# Patient Record
Sex: FEMALE | Race: ASIAN | Employment: FULL TIME | URBAN - METROPOLITAN AREA
[De-identification: names, ages, dates, MRNs, and addresses within clinical notes are randomized per-mention and may not be internally consistent; named-entity substitution may affect disease eponyms.]

---

## 2017-12-11 ENCOUNTER — HOSPITAL ENCOUNTER (EMERGENCY)
Facility: CLINIC | Age: 27
Discharge: HOME | End: 2017-12-11
Attending: EMERGENCY MEDICINE | Admitting: EMERGENCY MEDICINE
Payer: COMMERCIAL

## 2017-12-11 VITALS
SYSTOLIC BLOOD PRESSURE: 120 MMHG | TEMPERATURE: 98.2 F | HEART RATE: 92 BPM | OXYGEN SATURATION: 99 % | DIASTOLIC BLOOD PRESSURE: 82 MMHG | RESPIRATION RATE: 20 BRPM

## 2017-12-11 DIAGNOSIS — B34.9 VIRAL ILLNESS: ICD-10-CM

## 2017-12-11 LAB
DEPRECATED S PYO AG THROAT QL EIA: NORMAL
SPECIMEN SOURCE: NORMAL

## 2017-12-11 PROCEDURE — 87081 CULTURE SCREEN ONLY: CPT | Performed by: FAMILY MEDICINE

## 2017-12-11 PROCEDURE — 99284 EMERGENCY DEPT VISIT MOD MDM: CPT | Mod: Z6 | Performed by: EMERGENCY MEDICINE

## 2017-12-11 PROCEDURE — 99283 EMERGENCY DEPT VISIT LOW MDM: CPT | Performed by: EMERGENCY MEDICINE

## 2017-12-11 PROCEDURE — 25000132 ZZH RX MED GY IP 250 OP 250 PS 637: Performed by: EMERGENCY MEDICINE

## 2017-12-11 PROCEDURE — 87880 STREP A ASSAY W/OPTIC: CPT | Performed by: FAMILY MEDICINE

## 2017-12-11 RX ORDER — ACETAMINOPHEN 500 MG
500 TABLET ORAL EVERY 6 HOURS PRN
Qty: 60 TABLET | Refills: 0 | Status: SHIPPED | OUTPATIENT
Start: 2017-12-11 | End: 2017-12-18

## 2017-12-11 RX ORDER — ONDANSETRON 4 MG/1
4 TABLET, ORALLY DISINTEGRATING ORAL EVERY 8 HOURS PRN
Qty: 10 TABLET | Refills: 0 | Status: SHIPPED | OUTPATIENT
Start: 2017-12-11 | End: 2017-12-14

## 2017-12-11 RX ORDER — ACETAMINOPHEN 325 MG/1
650 TABLET ORAL ONCE
Status: COMPLETED | OUTPATIENT
Start: 2017-12-11 | End: 2017-12-11

## 2017-12-11 RX ORDER — IBUPROFEN 800 MG/1
800 TABLET, FILM COATED ORAL EVERY 8 HOURS PRN
Qty: 60 TABLET | Refills: 0 | Status: SHIPPED | OUTPATIENT
Start: 2017-12-11 | End: 2017-12-19

## 2017-12-11 RX ORDER — IBUPROFEN 800 MG/1
800 TABLET, FILM COATED ORAL ONCE
Status: COMPLETED | OUTPATIENT
Start: 2017-12-11 | End: 2017-12-11

## 2017-12-11 RX ADMIN — ACETAMINOPHEN 650 MG: 325 TABLET, FILM COATED ORAL at 22:18

## 2017-12-11 RX ADMIN — IBUPROFEN 800 MG: 800 TABLET ORAL at 22:18

## 2017-12-11 NOTE — ED AVS SNAPSHOT
[facility], [place], Emergency Department    [address]    [place] [address]    Phone:  [phone]    Fax:  [phone]                                       Diaz Raymond   MRN: [id number]    Department:  [facility], Emergency Department   Date of Visit:  12/11/2017           After Visit Summary Signature Page     I have received my discharge instructions, and my questions have been answered. I have discussed any challenges I see with this plan with the nurse or doctor.    ..........................................................................................................................................  Patient/Patient Representative Signature      ..........................................................................................................................................  Patient Representative Print Name and Relationship to Patient    ..................................................               ................................................  Date                                            Time    ..........................................................................................................................................  Reviewed by Signature/Title    ...................................................              ..............................................  Date                                                            Time

## 2017-12-11 NOTE — ED AVS SNAPSHOT
" [facility], Emergency Department    [address]    [facility] [address]    Phone:  [phone]    Fax:  [phone]                                       Diaz Raymond   MRN: [id number]    Department:  [facility] Emergency Department   Date of Visit:  12/11/2017           Patient Information     Date Of Birth          1990        Your diagnoses for this visit were:     Viral illness        You were seen by Shawn Belcher MD.      Follow-up Information     Follow up with [facility] Emergency Department.    Specialty:  EMERGENCY MEDICINE    Why:  If symptoms worsen    Contact information:    [address]  [facility] [address] [phone]    Additional information:    The [facility] is located in the [facility]. lt is easily accessible from virtually any point in the [facility] area, via [address]        Discharge Instructions       Please make an appointment to follow up with Your Primary Care Provider as soon as possible.      You must stay hydrated. I recommend mixing Gatorade and water and sip this over the course of the day. You should stay home from work for a few days. Make sure everyone at home is washing their hands.     Viral Syndrome (Adult)  A viral illness may cause a number of symptoms. The symptoms depend on the part of the body that the virus affects. If it settles in your nose, throat, and lungs, it may cause cough, sore throat, congestion, and sometimes headache. If it settles in your stomach and intestinal tract, it may cause vomiting and diarrhea. Sometimes it causes vague symptoms like \"aching all over,\" feeling tired, loss of appetite, or fever.  A viral illness usually lasts 1 to 2 weeks, but sometimes it lasts longer. In some cases, a more serious infection can look like a viral syndrome in the first few days of the illness. You may need another exam and additional tests to know the difference. Watch for the warning signs listed " below.  Home care  Follow these guidelines for taking care of yourself at home:    If symptoms are severe, rest at home for the first 2 to 3 days.    Stay away from cigarette smoke - both your smoke and the smoke from others.    You may use over-the-counter acetaminophen or ibuprofen for fever, muscle aching, and headache, unless another medicine was prescribed for this. If you have chronic liver or kidney disease or ever had a stomach ulcer or GI bleeding, talk with your doctor before using these medicines. No one who is younger than 18 and ill with a fever should take aspirin. It may cause severe disease or death.    Your appetite may be poor, so a light diet is fine. Avoid dehydration by drinking 8 to 12 8-ounce glasses of fluids each day. This may include water; orange juice; lemonade; apple, grape, and cranberry juice; clear fruit drinks; electrolyte replacement and sports drinks; and decaffeinated teas and coffee. If you have been diagnosed with a kidney disease, ask your doctor how much and what types of fluids you should drink to prevent dehydration. If you have kidney disease, drinking too much fluid can cause it build up in the your body and be dangerous to your health.    Over-the-counter remedies won't shorten the length of the illness but may be helpful for cough, sore throat; and nasal and sinus congestion. Don't use decongestants if you have high blood pressure.  Follow-up care  Follow up with your healthcare provider if you do not improve over the next week.  Call 911  Get emergency medical care if any of the following occur:    Convulsion    Feeling weak, dizzy, or like you are going to faint    Chest pain, shortness of breath, wheezing, or difficulty breathing  When to seek medical advice  Call your healthcare provider right away if any of these occur:    Cough with lots of colored sputum (mucus) or blood in your sputum    Chest pain, shortness of breath, wheezing, or difficulty breathing    Severe  headache; face, neck, or ear pain    Severe, constant pain in the lower right side of your belly (abdominal)    Continued vomiting (can t keep liquids down)    Frequent diarrhea (more than 5 times a day); blood (red or black color) or mucus in diarrhea    Feeling weak, dizzy, or like you are going to faint    Extreme thirst    Fever of 100.4 F (38 C) or higher, or as directed by your healthcare provider  Date Last Reviewed: 9/25/2015 2000-2017 [employer]. [address] [place] [address]. All rights reserved. This information is not intended as a substitute for professional medical care. Always follow your healthcare professional's instructions.          24 Hour Appointment Hotline       To make an appointment at any [facility], call [phone] ([phone]). If you don't have a family doctor or clinic, we will help you find one. [facility] clinics are conveniently located to serve the needs of you and your family.             Review of your medicines      START taking        Dose / Directions Last dose taken    acetaminophen 500 MG tablet   Commonly known as:  TYLENOL   Dose:  500 mg   Quantity:  60 tablet        Take 1 tablet (500 mg) by mouth every 6 hours as needed for pain or fever   Refills:  0        guaiFENesin 100 MG/5ML Syrp   Commonly known as:  ROBITUSSIN   Dose:  10 mL   Quantity:  1 Bottle        Take 10 mLs by mouth every 4 hours as needed for cough   Refills:  0        ibuprofen 800 MG tablet   Commonly known as:  ADVIL/MOTRIN   Dose:  800 mg   Quantity:  60 tablet        Take 1 tablet (800 mg) by mouth every 8 hours as needed for moderate pain   Refills:  0        ondansetron 4 MG ODT tab   Commonly known as:  ZOFRAN ODT   Dose:  4 mg   Quantity:  10 tablet        Take 1 tablet (4 mg) by mouth every 8 hours as needed for nausea   Refills:  0                Prescriptions were sent or printed at these locations (4 Prescriptions)                   Other  "Prescriptions                Printed at Department/Unit printer (4 of 4)         ibuprofen (ADVIL/MOTRIN) 800 MG tablet               acetaminophen (TYLENOL) 500 MG tablet               guaiFENesin (ROBITUSSIN) 100 MG/5ML SYRP               ondansetron (ZOFRAN ODT) 4 MG ODT tab                Procedures and tests performed during your visit     Beta strep group A culture    Rapid strep screen      Orders Needing Specimen Collection     None      Pending Results     Date and Time Order Name Status Description    12/11/2017 2027 Beta strep group A culture In process             Pending Culture Results     Date and Time Order Name Status Description    12/11/2017 2027 Beta strep group A culture In process             Pending Results Instructions     If you had any lab results that were not finalized at the time of your Discharge, you can call the ED Lab Result RN at [phone]. You will be contacted by this team for any positive Lab results or changes in treatment. The nurses are available 7 days a week from 10A to 6:30P.  You can leave a message 24 hours per day and they will return your call.        Thank you for choosing [facility]       Thank you for choosing [facility] for your care. Our goal is always to provide you with excellent care. Hearing back from our patients is one way we can continue to improve our services. Please take a few minutes to complete the written survey that you may receive in the mail after you visit with us. Thank you!        [employer] Information     [employer] lets you send messages to your doctor, view your test results, renew your prescriptions, schedule appointments and more. To sign up, go to [email or web address] . Click on \"Log in\" on the left side of the screen, which will take you to the Welcome page. Then click on \"Sign up Now\" on the right side of the page.     You will be asked to enter the access code listed below, as well as some personal information. Please follow the directions " to create your username and password.     Your access code is: [email or web address]  Expires: 3/11/2018 10:24 PM     Your access code will  in 90 days. If you need help or a new code, please call your [facility] clinic or [phone].        Care EveryWhere ID     This is your Care EveryWhere ID. This could be used by other organizations to access your [facility] medical records  [id number]        Equal Access to Services     [facility] Center[facility]ERAMY : Hadii enrique martinez hadasho Somajorali, waaxda luqadaha, qaybta kaalmada adeegyada, rafat roger . So [facility] [phone].    ATENCIÓN: Si habla español, tiene a lewis disposición servicios gratuitos de asistencia lingüística. Llame al [phone].    We comply with applicable federal civil rights laws and [place] laws. We do not discriminate on the basis of race, color, national origin, age, disability, sex, sexual orientation, or gender identity.            After Visit Summary       This is your record. Keep this with you and show to your community pharmacist(s) and doctor(s) at your next visit.

## 2017-12-12 NOTE — ED PROVIDER NOTES
[facility] ([facility])    12/11/17     ED 6 10:05 PM   History     Chief Complaint   Patient presents with     Fever     onset last night with fever, cough, sore throat, HA. T101.2 (tymp) upon arrival to ED.     The history is provided by the patient and a relative.     Diaz Raymond is a 27 year old female who presents with fever, cough, sore throat, headache that started last night. Patient states she felt fine throughout the weekend, felt fine in the morning with exception of a mild cough. She started to feel a little feverish yesterday evening. She states her fever continued to rise and by today she had a fever of 100.9  F at home. Family clarify that this was an axillary temperature. She endorses cough, sore throat, headache, back aches and myalgias. She vomited once, about 3 hours ago.  No abdominal pain. No diarrhea. No pain with urination. She denies sick contacts. No recent travel. No other medical problems. No daily medications. No allergies. She is otherwise healthy at baseline. She did try tylenol at 6pm today.     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     I have reviewed the Medications, Allergies, Past Medical and Surgical History, and Social History in the [employer] system.  History reviewed. No pertinent past medical history.    History reviewed. No pertinent surgical history.    No family history on file.    Social History   Substance Use Topics     Smoking status: Never Smoker     Smokeless tobacco: Never Used     Alcohol use No      Review of Systems  All other systems negative except as noted in the HPI.   Physical Exam   BP: 116/80  Pulse: 104  Temp: 100.2  F (37.9  C) (took tylenol ~2 hours ago)  Resp: 16  SpO2: 94 %      Physical Exam  Gen: NAD, sitting on stretcher, conversant and pleasant, non-toxic appearing  HEENT: NCAT, PERRL, EOMI, MMM  Neck: trachea midline, supple with FROM  Cardio: normal rate regular rhythm, no R/M/G, normally perfused and  warm  Pulm: steady, non-labored respirations, normal WOB, CTAB, no w/r/r  Abd: soft nt/nd, no organomegaly, no CVA tenderness  Ext: normal peripheral pulses, no edema, neurovascularly intact distally.  Skin: no rashes or signs of trauma  Neuro: no focal deficits, 5/5 strength in all ext   ED Course     ED Course     Procedures  Patient assessed in ED 6 at 10:05 PM by Dr. Belcher.     Labs Ordered and Resulted from Time of ED Arrival Up to the Time of Departure from the ED   RAPID STREP SCREEN   BETA STREP GROUP A CULTURE            Assessments & Plan (with Medical Decision Making)     This is an otherwise healthy 19-year-old female presents to the emergency department with a day and a half of body aches sore throat headache and fatigue likely secondary to a viral syndrome.  On exam she is well-appearing with normal vital signs.  She has full range of motion of her neck a benign abdomen and a clear chest.  I doubt any serious bacterial infections including meningitis pneumonia strep or urinary tract infection. Certainly could be influenza but given that she is healthy and otherwise well-appearing will treat symptomatically.  Encouraged her to take ibuprofen and Tylenol around-the-clock gave her cough syrup as well as some Zofran to help with her nausea and encouraged her to increase the amount of fluid she is taking she was discharged home at this time in good condition encouraged to rest for the next 2 days and follow-up with primary care.  She was given return emergency department precautions and discharged at this time    I have reviewed the nursing notes.    I have reviewed the findings, diagnosis, plan and need for follow up with the patient.    Discharge Medication List as of 12/11/2017 10:25 PM      START taking these medications    Details   ibuprofen (ADVIL/MOTRIN) 800 MG tablet Take 1 tablet (800 mg) by mouth every 8 hours as needed for moderate pain, Disp-60 tablet, R-0, Local Print      acetaminophen  (TYLENOL) 500 MG tablet Take 1 tablet (500 mg) by mouth every 6 hours as needed for pain or fever, Disp-60 tablet, R-0, Local Print      guaiFENesin (ROBITUSSIN) 100 MG/5ML SYRP Take 10 mLs by mouth every 4 hours as needed for cough, Disp-1 Bottle, R-0, Local Print      ondansetron (ZOFRAN ODT) 4 MG ODT tab Take 1 tablet (4 mg) by mouth every 8 hours as needed for nausea, Disp-10 tablet, R-0, Local Print             Final diagnoses:   Viral illness     I, Shayna Braswell, am serving as a trained medical scribe to document services personally performed by Shawn Belcher MD, based on the provider's statements to me on December 11, 2017.  This document has been checked and approved by the attending provider.    I, Shawn Belcher MD, was physically present and have reviewed and verified the accuracy of this note documented by Shayna Braswell, medical scribe.     12/11/2017   [facility], [place], EMERGENCY DEPARTMENT     Shawn Belcher MD  12/12/17 0033

## 2017-12-12 NOTE — DISCHARGE INSTRUCTIONS
"Please make an appointment to follow up with Your Primary Care Provider as soon as possible.      You must stay hydrated. I recommend mixing Gatorade and water and sip this over the course of the day. You should stay home from work for a few days. Make sure everyone at home is washing their hands.     Viral Syndrome (Adult)  A viral illness may cause a number of symptoms. The symptoms depend on the part of the body that the virus affects. If it settles in your nose, throat, and lungs, it may cause cough, sore throat, congestion, and sometimes headache. If it settles in your stomach and intestinal tract, it may cause vomiting and diarrhea. Sometimes it causes vague symptoms like \"aching all over,\" feeling tired, loss of appetite, or fever.  A viral illness usually lasts 1 to 2 weeks, but sometimes it lasts longer. In some cases, a more serious infection can look like a viral syndrome in the first few days of the illness. You may need another exam and additional tests to know the difference. Watch for the warning signs listed below.  Home care  Follow these guidelines for taking care of yourself at home:    If symptoms are severe, rest at home for the first 2 to 3 days.    Stay away from cigarette smoke - both your smoke and the smoke from others.    You may use over-the-counter acetaminophen or ibuprofen for fever, muscle aching, and headache, unless another medicine was prescribed for this. If you have chronic liver or kidney disease or ever had a stomach ulcer or GI bleeding, talk with your doctor before using these medicines. No one who is younger than 18 and ill with a fever should take aspirin. It may cause severe disease or death.    Your appetite may be poor, so a light diet is fine. Avoid dehydration by drinking 8 to 12 8-ounce glasses of fluids each day. This may include water; orange juice; lemonade; apple, grape, and cranberry juice; clear fruit drinks; electrolyte replacement and sports drinks; and " decaffeinated teas and coffee. If you have been diagnosed with a kidney disease, ask your doctor how much and what types of fluids you should drink to prevent dehydration. If you have kidney disease, drinking too much fluid can cause it build up in the your body and be dangerous to your health.    Over-the-counter remedies won't shorten the length of the illness but may be helpful for cough, sore throat; and nasal and sinus congestion. Don't use decongestants if you have high blood pressure.  Follow-up care  Follow up with your healthcare provider if you do not improve over the next week.  Call 911  Get emergency medical care if any of the following occur:    Convulsion    Feeling weak, dizzy, or like you are going to faint    Chest pain, shortness of breath, wheezing, or difficulty breathing  When to seek medical advice  Call your healthcare provider right away if any of these occur:    Cough with lots of colored sputum (mucus) or blood in your sputum    Chest pain, shortness of breath, wheezing, or difficulty breathing    Severe headache; face, neck, or ear pain    Severe, constant pain in the lower right side of your belly (abdominal)    Continued vomiting (can t keep liquids down)    Frequent diarrhea (more than 5 times a day); blood (red or black color) or mucus in diarrhea    Feeling weak, dizzy, or like you are going to faint    Extreme thirst    Fever of 100.4 F (38 C) or higher, or as directed by your healthcare provider  Date Last Reviewed: 9/25/2015 2000-2017 [employer]. [address] [place] [address]. All rights reserved. This information is not intended as a substitute for professional medical care. Always follow your healthcare professional's instructions.

## 2017-12-13 LAB
BACTERIA SPEC CULT: NORMAL
SPECIMEN SOURCE: NORMAL

## 2020-10-26 ENCOUNTER — TELEPHONE (OUTPATIENT)
Dept: FAMILY MEDICINE | Facility: CLINIC | Age: 30
End: 2020-10-26

## 2020-10-26 ENCOUNTER — OFFICE VISIT (OUTPATIENT)
Dept: FAMILY MEDICINE | Facility: CLINIC | Age: 30
End: 2020-10-26
Payer: COMMERCIAL

## 2020-10-26 VITALS
TEMPERATURE: 98.5 F | HEART RATE: 71 BPM | WEIGHT: 178.2 LBS | DIASTOLIC BLOOD PRESSURE: 80 MMHG | SYSTOLIC BLOOD PRESSURE: 115 MMHG | RESPIRATION RATE: 18 BRPM | OXYGEN SATURATION: 99 %

## 2020-10-26 DIAGNOSIS — Z23 NEED FOR PROPHYLACTIC VACCINATION AND INOCULATION AGAINST INFLUENZA: ICD-10-CM

## 2020-10-26 DIAGNOSIS — N91.2 AMENORRHEA: ICD-10-CM

## 2020-10-26 DIAGNOSIS — N93.9 VAGINAL SPOTTING: ICD-10-CM

## 2020-10-26 DIAGNOSIS — Z32.01 PREGNANCY TEST POSITIVE: Primary | ICD-10-CM

## 2020-10-26 LAB — HCG UR QL: POSITIVE

## 2020-10-26 PROCEDURE — 90686 IIV4 VACC NO PRSV 0.5 ML IM: CPT | Performed by: STUDENT IN AN ORGANIZED HEALTH CARE EDUCATION/TRAINING PROGRAM

## 2020-10-26 PROCEDURE — 90471 IMMUNIZATION ADMIN: CPT | Performed by: STUDENT IN AN ORGANIZED HEALTH CARE EDUCATION/TRAINING PROGRAM

## 2020-10-26 PROCEDURE — 81025 URINE PREGNANCY TEST: CPT | Performed by: STUDENT IN AN ORGANIZED HEALTH CARE EDUCATION/TRAINING PROGRAM

## 2020-10-26 PROCEDURE — 99203 OFFICE O/P NEW LOW 30 MIN: CPT | Mod: 25 | Performed by: STUDENT IN AN ORGANIZED HEALTH CARE EDUCATION/TRAINING PROGRAM

## 2020-10-26 RX ORDER — PRENATAL VIT/IRON FUM/FOLIC AC 27MG-0.8MG
1 TABLET ORAL DAILY
Qty: 90 TABLET | Refills: 3 | Status: SHIPPED | OUTPATIENT
Start: 2020-10-26 | End: 2021-04-09

## 2020-10-26 NOTE — PROGRESS NOTES
Nursing Notes:   Jeovany Juarez EMT  10/26/2020  4:39 PM  Signed  Due to patient being non-English speaking/uses sign language, an  was used for this visit. Only for face-to-face interpretation by an external agency, date and length of interpretation can be found on the scanned worksheet.       name:  [facility]  Agency: Grecia Oliveira  Language: Bessy  Telephone number:   [phone]  Type of interpretation:  telephone, Spoken    EBENEZER Doran  4:39 PM  10/26/2020      Chief Complaint   Patient presents with     Pregnancy Test     UPT, bleeding discharge, onset 2 days ago.     Imm/Inj     Flu Shot     Blood pressure 115/80, pulse 71, temperature 98.5  F (36.9  C), resp. rate 18, weight 80.8 kg (178 lb 3.2 oz), SpO2 99 %.           SUBJECTIVE       Cho is a 30 year old  female with a PMH significant for:   There is no problem list on file for this patient.    Pregnancy Test/Confirmation      Cho is a30 year old Woman, No obstetric history on file.  without a significant past medical history who presents requesting a pregnancy test.   LMP 8/29/2020. States that she took an OTC pregnancy test a month ago that was positive. Has been taking prenatal vitamins since, but has not established OB care with anyone yet.  Contraceptive method : none  Symptoms of pregnancy: fatigue  Any Bleeding since LMP? Yes Details: light vaginal spotting, first noticed yesterday. Also with some lower abdominal cramping.     If pregnant, pregnancy is Unplanned, Desired    Patient reports feeling safe and home and work. Has a female friend who is here today with her.       A Bsesy  was used for this visit.    PMH, Medications and Allergies were reviewed and updated as needed.        REVIEW OF SYSTEMS     Constitutional, HEENT, cardiovascular, pulmonary, gi and gu systems are negative, except as otherwise noted.          OBJECTIVE     Vitals:    10/26/20 1620   BP: 115/80   BP Location: Left arm   Patient Position: Left  side   Cuff Size: Adult Regular   Pulse: 71   Resp: 18   Temp: 98.5  F (36.9  C)   SpO2: 99%   Weight: 80.8 kg (178 lb 3.2 oz)     There is no height or weight on file to calculate BMI.    Constitutional: Awake, alert, cooperative, no acute distress, and appears stated age.  Eyes: sclera clear, conjunctiva normal.  ENT: NC/AT, MMM  Neck: Supple, symmetrical, trachea midline  Back: Symmetric, no curvature  Lungs: No increased WOB, CTABL, no crackles or wheezing appreciated.  Cardiovascular: RRR, normal S1 and S2, no S3 or S4, and no murmur appreciated.  Abdomen: Normal BS, soft, non-distended, non-tender  Musculoskeletal: No redness, warmth, or swelling of the joints. Tone is normal.  Neurologic: A&Ox3.  Cranial nerves II-XII are grossly intact.  Sensory is intact and gait is normal.  Neuropsychiatric: Normal affect, mood, orientation, memory and insight.  Skin: No visible rashes, erythema, pallor, petechia or purpura.      Results for orders placed or performed in visit on 10/26/20   HCG Qualitative Urine (UPT)  ([facility])     Status: Abnormal   Result Value Ref Range    HCG Qual Urine POSITIVE (A) Negative       ASSESSMENT AND PLAN     Cho was seen today for pregnancy test and imm/inj.    Diagnoses and all orders for this visit:    Pregnancy test positive  Amenorrhea  Vaginal spotting  Advised patient to go to [facility] ED for BPP and NST to ensure viability and confirmation. Will forward patient's chart to our OB coordinator to set up initial OB visit with female provider, per patient preference.   -     Prenatal Vit-Fe Fumarate-FA (PRENATAL MULTIVITAMIN W/IRON) 27-0.8 MG tablet; Take 1 tablet by mouth daily  -     HCG Qualitative Urine (UPT)  ([facility])    Need for prophylactic vaccination and inoculation against influenza  -     INFLUENZA VACCINE IM > 6 MONTHS VALENT IIV4 [16989]      Of note, US OB on 10/26/20 without evidence of intrauterine pregnancy, but with thickened endometrium which may be physiologic.  Patient likely will need quantitative beta hCG levels.      Return in about 2 weeks (around 11/9/2020) for initial OB visit.    Hiro Ramírez MD  10/26/2020

## 2020-10-26 NOTE — PROGRESS NOTES
Preceptor Attestation:    Patient seen and evaluated in person. I discussed the patient with the resident. I have verified the content of the note, which accurately reflects my assessment of the patient and the plan of care.   Supervising Physician:  Dwayne Muniz MD.

## 2020-10-26 NOTE — PATIENT INSTRUCTIONS
Patient Education     Bleeding During Early Pregnancy     Ultrasound can help check the health of your fetus.     If you ve had bleeding early in your pregnancy, you re not alone. Many other pregnant women have had early bleeding, too. And in most cases, nothing is wrong. But your healthcare provider still needs to know about it. He or she may want to do tests to find out why you re bleeding. Call your healthcare provider if you notice bleeding during pregnancy.  What causes early bleeding?  The cause of bleeding early in pregnancy is often unknown. But many factors early on in pregnancy may lead to bleeding or spotting. These include sexual intercourse, which may cause bleeding in any trimester. Here are some other causes:    Implantation of the embryo on the uterine wall    Subchorionic hemorrhage (bleeding between the sac membrane and the uterus)    Miscarriage    Ectopic (tubal) pregnancy  If you notice spotting  Spotting (very light bleeding) is the most common type of bleeding in early pregnancy. If you notice it, call your healthcare provider. Chances are, he or she will tell you that you can care for yourself at home.  If tests are needed  Depending on how much you bleed, your healthcare provider may ask you to come in for some tests. A pelvic exam, for instance, can help see how far along your pregnancy is. You also may have an ultrasound or a Doppler test. These imaging tests use sound waves to check the health of your fetus. The ultrasound may be done on your belly or inside your vagina. Your healthcare provider also may order a special blood test. This test compares your hormone levels in blood samples taken 2 days apart. The results can help your healthcare provider learn more about the implantation of the embryo. Your blood type will also need to be checked to evaluate whether you will need to be treated for Rh sensitization.   Warning signs  If your bleeding doesn t stop or if you notice any of the  following, seek medical help right away:    Soaking a sanitary pad each hour    Bleeding like you re having a period    Cramping or severe belly pain    Feeling dizzy or faint    Tissue passing through your vagina    Bleeding at any time after the first trimester  Questions you may be asked  Though not normal, bleeding early in pregnancy is common. If you ve noticed any bleeding, you may be concerned. But keep in mind that bleeding alone doesn t mean something is wrong. Call your healthcare provider right away, though. He or she may ask you questions like these to help find the cause of your bleeding:    When did your bleeding start?    Is your bleeding very light (spotting) or is it like a period?    Is the blood bright red or brownish?    Have you had sexual intercourse recently?    Have you had pain or cramping?    Have you felt dizzy or faint?  Monitoring your pregnancy  Bleeding will often stop as quickly as it began. Your pregnancy may go on a normal path again. You may need to make a few extra prenatal visits. But you and your baby will most likely be fine.  Date Last Reviewed: 6/1/2016 2000-2019 The [employer]. [address] [place] [address]. All rights reserved. This information is not intended as a substitute for professional medical care. Always follow your healthcare professional's instructions.           Patient Education     Established Pregnancy, Normal Symptoms    You are pregnant and are having symptoms that worry you. During pregnancy, it s normal to have many kinds of symptoms. Here is a list of common symptoms that happen during pregnancy.  Head and mouth    Bleeding gums    Dizziness and fainting    Extra saliva    Headaches    Nosebleeds    Skin color changes on your face    Stuffy nose    Mild blurriness of vision, especially if you wear contact lenses  Breasts    Darkening of nipples    Yellow or white discharge from the nipples    Sore breasts and nipples    Swollen  breasts  Back, arms, and legs    Back, hip, or thigh pain    Leg cramps that come and go    Numbness and tingling in your hands and fingers    Swollen hands, legs, and feet    Swollen leg veins  Belly (abdomen) and pelvis    Constipation    Feeling of pressure on your bladder and stomach    Need to urinate often    Gas and bloating    Heartburn    Anal itching, swelling, and bleeding (hemorrhoids)    Leaking urine    Mild pressure or cramping in your belly    Nausea and vomiting throughout the day or night (morning sickness)    Swollen belly    Clear to white vaginal discharge  Other symptoms    Dry, itchy skin    Forgetfulness    Less interest in sex    Mood swings    Tiredness    Trouble sleeping  Home care  Here is information that may help relieve some common pregnancy symptoms.  Sore and swollen breasts    Wear a support bra that fits properly.  Nausea and indigestion    Eat smaller meals or snacks more often.    Eat bland foods, such as bananas, crackers, or rice.    Stay away from spicy, fatty, or fried foods.    Stay away from alcohol, caffeine, and tobacco.    Don t lie down right after eating.    Raise your head with pillows when you lie down.    Eat foods or beverages that have ginger. If you drink ginger ale, be sure to make sure it has real ginger and not just ginger flavoring.  Leg swelling and varicose veins    Wear elastic support hose. Put your feet up as often as possible.  Constipation    Eat more fresh fruits and vegetables and more whole grains. Drink more clear liquids.  Joint and muscle pains    Avoid heavy lifting.    Pick things up by bending at your knees, not at your waist.    Use acetaminophen for joint and muscle pain. Don't use aspirin, ibuprofen, or naproxen.  Mouth and nose dryness or bleeding    Drink more liquids. Use a vaporizer or humidifier in your bedroom.  Don t take medicines or use remedies that your healthcare provider hasn t approved. If you have symptoms that are severe or  sudden, call your healthcare provider.  Call 911  Call 911 if any of these occur:    New chest, arm, shoulder, neck, or upper back pain    Trouble breathing    Severe belly pain or very heavy bleeding    Severe lightheadedness, passing out, or fainting    Rapid heart rate    Confusion or difficulty waking up  When to seek medical advice  Call your healthcare provider right away if any of these occur:    Burning or pain when you urinate    Depression or severe anxiety    Desire to eat or drink nonfood items such as paper, dirt, or cleaning products    Diarrhea that lasts more than 24 hours    Fast heartbeat or heart palpitations    Fever of 100.4 F (38 C) or higher, or as directed by your healthcare provider    You can t keep fluids down for 6 hours without vomiting    Severe or ongoing vomiting    Little or no urine    Major vision changes    Moderate or severe belly pain    Severe back pain    Severe constipation    Severe cramping or swelling in a leg, especially if it s just on one side    Severe headache    Sudden swelling of your face, hands, feet, or ankles    Vaginal bleeding    Very itchy skin that doesn t get better  Date Last Reviewed: 10/1/2016    9364-8906 The [employer]. [address] [place] [address]. All rights reserved. This information is not intended as a substitute for professional medical care. Always follow your healthcare professional's instructions.

## 2020-10-26 NOTE — NURSING NOTE
Due to patient being non-English speaking/uses sign language, an  was used for this visit. Only for face-to-face interpretation by an external agency, date and length of interpretation can be found on the scanned worksheet.       name:  [facility]  Agency: [employer] Olive[employer]  Language: Bessy  Telephone number:   [phone]  Type of interpretation:  telephone, Spoken    EBENEZER Doran  4:39 PM  10/26/2020

## 2020-10-26 NOTE — TELEPHONE ENCOUNTER
Answering Service    I received a page from the answering service 5:38 PM re: Ultrasound. I returned the call at 5:56pm. Mrs. Raymond was calling to clarify where she should go for ultrasound.     Mrs. Raymond was seen in clinic by Dr. Ramírez today. Had a positive pregnancy test, but was also experiencing vaginal bleeding. The patient was instructed to go to [facility] for an ultrasound. Dr. Ramírez called the maternity floor and clarified that the patient should go through the ED instead of going directly to the maternity floor.    I called the patient back and clarified that she should go through the ED to get care at [facility]. All questions were answered.     Cynthia Hunt MD PGY2  [facility]  10/26/2020

## 2020-10-28 ENCOUNTER — TELEPHONE (OUTPATIENT)
Dept: FAMILY MEDICINE | Facility: CLINIC | Age: 30
End: 2020-10-28

## 2020-10-28 DIAGNOSIS — Z32.01 PREGNANCY TEST POSITIVE: Primary | ICD-10-CM

## 2020-10-28 NOTE — TELEPHONE ENCOUNTER
[facility] phone call message- general phone call:    Reason for call: Pt coming in for labs on 10/29/2020, possible miscarriage, needs order for hormone levels    Action desired: put in lab order     Return call needed: No    OK to leave a message on voice mail? No    Advised patient to response may take up to 2 business days: No    Primary language: English      needed? No    Call taken on October 28, 2020 at 11:22 AM by Africa Lynch

## 2020-10-29 DIAGNOSIS — Z32.01 PREGNANCY TEST POSITIVE: ICD-10-CM

## 2020-10-29 LAB — B-HCG SERPL-ACNC: 432 MLU/ML (ref 0–4)

## 2020-11-02 ENCOUNTER — OFFICE VISIT (OUTPATIENT)
Dept: FAMILY MEDICINE | Facility: CLINIC | Age: 30
End: 2020-11-02
Payer: COMMERCIAL

## 2020-11-02 VITALS
DIASTOLIC BLOOD PRESSURE: 79 MMHG | WEIGHT: 176.4 LBS | OXYGEN SATURATION: 94 % | RESPIRATION RATE: 24 BRPM | TEMPERATURE: 98.8 F | SYSTOLIC BLOOD PRESSURE: 112 MMHG | HEART RATE: 80 BPM

## 2020-11-02 DIAGNOSIS — O03.9 SPONTANEOUS ABORTION: Primary | ICD-10-CM

## 2020-11-02 PROCEDURE — 90471 IMMUNIZATION ADMIN: CPT | Performed by: STUDENT IN AN ORGANIZED HEALTH CARE EDUCATION/TRAINING PROGRAM

## 2020-11-02 PROCEDURE — 90715 TDAP VACCINE 7 YRS/> IM: CPT | Performed by: STUDENT IN AN ORGANIZED HEALTH CARE EDUCATION/TRAINING PROGRAM

## 2020-11-02 PROCEDURE — 99213 OFFICE O/P EST LOW 20 MIN: CPT | Mod: 25 | Performed by: STUDENT IN AN ORGANIZED HEALTH CARE EDUCATION/TRAINING PROGRAM

## 2020-11-02 NOTE — PROGRESS NOTES
SUBJECTIVE       Cho Segundo is a 30 year old  female with a PMH significant for:   There is no problem list on file for this patient.    She presents for follow up of <14 week OB ultrasound following vaginal bleeding and low quantitative HCG. Pt's ultrasouond results were discussed with her which confirmed a miscarriage. Pt understood the results.    Pt wants to get pregnant again. She does not want to go on any form of birth control at this time. Pt had questions regarding why she may have had a  Miscarriage and what the likelihood she will be able to get pregnant is.     PMH, Medications and Allergies were reviewed and updated as needed.        REVIEW OF SYSTEMS     7 point ROS negative except per HPI        OBJECTIVE     Vitals:    20 1624   BP: 112/79   BP Location: Left arm   Patient Position: Sitting   Cuff Size: Adult Regular   Pulse: 80   Resp: 24   Temp: 98.8  F (37.1  C)   TempSrc: Oral   SpO2: 94%   Weight: 80 kg (176 lb 6.4 oz)     There is no height or weight on file to calculate BMI.    Constitutional: Awake, alert, cooperative, no apparent distress, and appears stated age.  Eyes: Lids and lashes normal, pupils equal, round and reactive to light, extra ocular muscles intact, sclera clear, conjunctiva normal.  Lungs: No increased work of breathing, good air exchange, clear to auscultation bilaterally, no crackles or wheezing.  Cardiovascular: Regular rate and rhythm, normal S1 and S2, no S3 or S4, and no murmur noted.  Neurologic: Awake, alert, oriented to name, place and time.  Cranial nerves II-XII are grossly intact.    Neuropsychiatric: Normal affect, mood, orientation, memory and insight.  Skin: No rashes, erythema, pallor, petechia or purpura.    No results found for any visits on 20.        ASSESSMENT AND PLAN     Diaz was seen today for follow up.    Diagnoses and all orders for this visit:    Spontaneous   Pts ultrasound and quantitative HCG suggested miscarriage. HCG on  10/26/20 was 4262 and then on 10/29/20 was 432. She was counseled on birth control options but stated she wants to get pregnant again. Pt advised to continue prenatal vitamin and to avoid NSAIDs, alcohol, and tobacco.     Other orders  -     TDAP VACCINE (Adacel, Boostrix)  [[id number]]          RTC in as needed   Sukhdeep Inman MD  Precepted with Dr Sabiha Morales

## 2021-02-22 ENCOUNTER — OFFICE VISIT (OUTPATIENT)
Dept: FAMILY MEDICINE | Facility: CLINIC | Age: 31
End: 2021-02-22
Payer: COMMERCIAL

## 2021-02-22 VITALS
OXYGEN SATURATION: 97 % | HEART RATE: 79 BPM | DIASTOLIC BLOOD PRESSURE: 75 MMHG | RESPIRATION RATE: 20 BRPM | TEMPERATURE: 98.5 F | WEIGHT: 183.4 LBS | SYSTOLIC BLOOD PRESSURE: 110 MMHG

## 2021-02-22 DIAGNOSIS — N91.2 AMENORRHEA: Primary | ICD-10-CM

## 2021-02-22 DIAGNOSIS — O20.9 FIRST TRIMESTER BLEEDING: ICD-10-CM

## 2021-02-22 DIAGNOSIS — R11.0 NAUSEA: ICD-10-CM

## 2021-02-22 LAB
B-HCG SERPL-ACNC: ABNORMAL MLU/ML (ref 0–4)
HCG UR QL: POSITIVE

## 2021-02-22 PROCEDURE — 81025 URINE PREGNANCY TEST: CPT | Performed by: STUDENT IN AN ORGANIZED HEALTH CARE EDUCATION/TRAINING PROGRAM

## 2021-02-22 PROCEDURE — 99213 OFFICE O/P EST LOW 20 MIN: CPT | Mod: GC | Performed by: STUDENT IN AN ORGANIZED HEALTH CARE EDUCATION/TRAINING PROGRAM

## 2021-02-22 PROCEDURE — 36415 COLL VENOUS BLD VENIPUNCTURE: CPT | Performed by: STUDENT IN AN ORGANIZED HEALTH CARE EDUCATION/TRAINING PROGRAM

## 2021-02-22 RX ORDER — PYRIDOXINE HCL (VITAMIN B6) 25 MG
25 TABLET ORAL 3 TIMES DAILY PRN
Qty: 60 TABLET | Refills: 11 | Status: SHIPPED | OUTPATIENT
Start: 2021-02-22 | End: 2021-05-04

## 2021-02-22 NOTE — PROGRESS NOTES
SUBJECTIVE       Cho C Segundo is a 31 year old  female with a PMH significant for:   There is no problem list on file for this patient.    Patient presents with:  Pregnancy Test: upt, missed period for 1 month now and test at home was positive  Nausea: feel nausea    Patient reports that she is having some spotting. She had a positive at home pregnancy test late . Yesterday an today she had some bleeding, a small amount when wiping. No abdominal pain or fever. She has had one miscarriage in the past. She also reports some nausea and breast tenderness. She is interested in managing her nausea medically.     PMH, Medications and Allergies were reviewed and updated as needed.          OBJECTIVE     Vitals:    21 1308   BP: 110/75   BP Location: Right arm   Patient Position: Sitting   Cuff Size: Adult Regular   Pulse: 79   Resp: 20   Temp: 98.5  F (36.9  C)   TempSrc: Tympanic   SpO2: 97%   Weight: 83.2 kg (183 lb 6.4 oz)     There is no height or weight on file to calculate BMI.    General :  healthy and alert, no distress  HEENT:  PERRLA  Cardiovascular: regular rate and rhythm, normal S1/S2 no other heart sounds  Respiratory:  CTA, normal respiratory effort  Musculoskeletal: no edema  Gastrointestinal:       abdomen soft, non-tender, non-distended, no organomegaly or masses, normal bowel sounds    Results for orders placed or performed in visit on 21 (from the past 24 hour(s))   HCG Qualitative Urine (UPT)  ([facility])   Result Value Ref Range    HCG Qual Urine Positive (A) Negative       ASSESSMENT AND PLAN       Diaz was seen today for pregnancy test and nausea.    Diagnoses and all orders for this visit:    Amenorrhea  -     HCG Qualitative Urine (UPT)  ([facility])  -     doxylamine (UNISOM) 25 MG TABS tablet; Take 1 tablet (25 mg) by mouth At Bedtime  -     pyridOXINE (VITAMIN B6) 25 MG tablet; Take 1 tablet (25 mg) by mouth 3 times daily as needed (nausea)  -     Cancel: HCG quantitative  pregnancy  -     Beta-HCG Quantitative ([facility]); Future  -     Beta-HCG Quantitative ([facility])  -     US OB <14 WKS SINGLE OR FIRST GESTATION; Future    First trimester bleeding    First trimester bleeding without pain or fever. DDX include normal pregnancy, ectopic pregnancy, and miscarriage. Will workup as above, HCG today and then in 2 days.  Treating nausea as above as well. Follow-up guided by test results.     Discussed with MD Jeffery Tatum MD PGY 3  [facility]

## 2021-02-22 NOTE — PROGRESS NOTES
Preceptor Attestation:    I discussed the patient with the resident and evaluated the patient in person. I have verified the content of the note, which accurately reflects my assessment of the patient and the plan of care.   Supervising Physician:  Alex Watts MD.

## 2021-02-24 DIAGNOSIS — N91.2 AMENORRHEA: ICD-10-CM

## 2021-02-24 LAB — B-HCG SERPL-ACNC: ABNORMAL MLU/ML (ref 0–4)

## 2021-02-24 PROCEDURE — 36415 COLL VENOUS BLD VENIPUNCTURE: CPT

## 2021-03-01 ENCOUNTER — OFFICE VISIT (OUTPATIENT)
Dept: FAMILY MEDICINE | Facility: CLINIC | Age: 31
End: 2021-03-01
Payer: MEDICAID

## 2021-03-01 ENCOUNTER — ANCILLARY PROCEDURE (OUTPATIENT)
Dept: ULTRASOUND IMAGING | Facility: CLINIC | Age: 31
End: 2021-03-01
Attending: STUDENT IN AN ORGANIZED HEALTH CARE EDUCATION/TRAINING PROGRAM
Payer: MEDICAID

## 2021-03-01 VITALS
RESPIRATION RATE: 16 BRPM | TEMPERATURE: 98.4 F | HEIGHT: 61 IN | DIASTOLIC BLOOD PRESSURE: 73 MMHG | BODY MASS INDEX: 34.1 KG/M2 | WEIGHT: 180.6 LBS | SYSTOLIC BLOOD PRESSURE: 108 MMHG | HEART RATE: 76 BPM | OXYGEN SATURATION: 100 %

## 2021-03-01 DIAGNOSIS — Z3A.08 8 WEEKS GESTATION OF PREGNANCY: Primary | ICD-10-CM

## 2021-03-01 DIAGNOSIS — N91.2 AMENORRHEA: ICD-10-CM

## 2021-03-01 PROCEDURE — 99213 OFFICE O/P EST LOW 20 MIN: CPT | Mod: GC | Performed by: STUDENT IN AN ORGANIZED HEALTH CARE EDUCATION/TRAINING PROGRAM

## 2021-03-01 ASSESSMENT — MIFFLIN-ST. JEOR: SCORE: 1474.45

## 2021-03-01 NOTE — PATIENT INSTRUCTIONS
"  Patient Education     Pregnancy: Your First Trimester Changes  The first trimester is a time of rapid development for your baby. Because your baby is growing so quickly, it is important that you start a healthy lifestyle right away. By the end of the first trimester, your baby has formed all of its major body organs and weighs just over an ounce.      Actual size of baby is 1/4\"    Month 1 (weeks 1 to 4)  The placenta (the organ that nourishes your baby) begins to form. The brain, spinal cord, heart, gastrointestinal tract, and lungs begin to develop. Your baby is about   inch long by the end of the first month.      Actual size of baby is 1\"    Month 2 (weeks 5 to 8)  All of your baby s major body organs form. The face, fingers, toes, ears, and eyes appear. By the end of the month, your baby is about 1 inch long.      Actual size of baby is 3\"      Month 3 (weeks 9 to 12)  Your baby can open and close its fists and mouth. The sexual organs begin to form. As the first trimester ends, your baby is about 3 inches long.   [email or web address] last reviewed this educational content on 8/1/2020 2000-2020 The "[employer] [address] [place] [address]. All rights reserved. This information is not intended as a substitute for professional medical care. Always follow your healthcare professional's instructions.           Patient Education     Adapting to Pregnancy: First Trimester  As your body adjusts during your first trimester of pregnancy, you may have to change or limit your daily activities. You ll need more rest. You may also need to use the energy you have more wisely.   Your changing body  Almost every part of your body is affected as you adapt to pregnancy. The uterus and cervix will start to soften right away. You may not look very pregnant during the first 3 months. But you are likely to have some common signs of early pregnancy:     Nausea    Fatigue    Frequent urination    Mood " swings    Bloating of the belly    Constipation    Heartburn    Missed or light periods (first trimester bleeding)    Nipple or breast tenderness and breast swelling  It s not too late to start good habits   What matters most is protecting your baby from this moment on. If you smoke, drink alcohol, or use drugs, now is the time to stop. If you need help, talk with your healthcare provider:     Smoking increases the risk of stillbirth or having a low-birth-weight baby. If you smoke, quit now.    Alcohol and drugs have been linked with miscarriage, birth defects, intellectual disability, and low birth weight. Don't drink alcohol or take drugs.  Tips to relieve nausea  During pregnancy, nausea can happen at any time of the day, but it may be worse in the morning. To help prevent nausea:     Eat small, light meals at frequent intervals.    Drink fluids often.    Get up slowly. Eat a few unsalted crackers before you get out of bed.    Avoid smells that bother you.    Avoid spicy and fatty foods.    Eat an ice pop in your favorite flavor.    Get plenty of rest.    Ask your healthcare provider about taking isael or vitamin B6 for nausea and vomiting.    Talk with your healthcare provider if you take vitamins that upset your stomach.   Work concerns  The end of the first trimester is a good time to discuss working during pregnancy with your employer. Follow your healthcare provider s advice if your job needs you to stand for a long time, work with hazardous tools, or even sit at a desk all day. Your workspace, workload, or scheduled hours may need to be adjusted. Perhaps you can change body postures more often or take an extra break.   Advice for travel  Talk to your healthcare provider first, but the second trimester may be the best time for any travel. You may be advised to avoid certain trips while you re pregnant. Food and water can be concerns in developing countries. Travel by car is a good choice, as you can stop,  get out, and stretch. Bring snacks and water along. Fasten the lap belt below your belly, low over your hips. Also be sure to wear the shoulder harness.   Intimacy  Unless your healthcare provider tells you to, there's no reason to stop having sex while you re pregnant. You or your partner may notice changes in desire. Desire may be less in the first trimester, due to nausea and fatigue. In the second trimester, sex may be very enjoyable. The third trimester can be a challenge comfort-wise. Try different positions and see what s best for you both.   Ray last reviewed this educational content on 4/1/2020 2000-2020 [employer]. [address] [place] [address], [place] [address]. All rights reserved. This information is not intended as a substitute for professional medical care. Always follow your healthcare professional's instructions.

## 2021-03-01 NOTE — PROGRESS NOTES
Preceptor Attestation:    I discussed the patient with the resident and evaluated the patient in person. I have verified the content of the note, which accurately reflects my assessment of the patient and the plan of care.   Supervising Physician:  Dwayne Muniz MD.

## 2021-03-01 NOTE — PROGRESS NOTES
"  ASSESSMENT AND PLAN     1. 8 weeks gestation of pregnancy  Patient was told she was having a miscarriage. Beta hcg has been steadily increasing with a positive IUP at 8 weeks on ultrasound today. Discussed with patient that after around 6 weeks gestational age, the beta hcg blood levels do not necessarily need to double every 48 hours any more especially if over 10,000 like was in clinic the other day. This is why we recheck though to make sure that the number is not steadily decreasing. Should continue prenatal vitamins. Discussed that first trimester bleeding is very common and can be due to implantation even after has implanted in uterus. This will be her first baby.   - Schedule first OB visit      RTC for New OB visit.    The patient was seen by, and discussed with, Dr. Dwayne Heaton, who agrees with the plan.    Angeles Vinson MD PGY3  [facility]         SUBJECTIVE       Diaz Raymond is a 31 year old  female with a PMH significant for:   There is no problem list on file for this patient.    She presents for a follow up of a miscarriage.     LMP was 21. 8w3d. . Previous miscarriage 10/26/20. Still having nausea and breast tenderness. Was having bleeding, now stopped last week.   Beta hcg 35,388 21  Beta hcg 40,343 on 21.   Denies fever/chills, vaginal bleeding, vaginal discharge, abdominal pain, headache, chest pain or shortness of breath.       PMH, Medications and Allergies were reviewed and updated as needed.        REVIEW OF SYSTEMS     ROS reviewed in HPI.         OBJECTIVE     Vitals:    21 1531   BP: 108/73   BP Location: Left arm   Patient Position: Sitting   Cuff Size: Adult Regular   Pulse: 76   Resp: 16   Temp: 98.4  F (36.9  C)   TempSrc: Oral   SpO2: 100%   Weight: 81.9 kg (180 lb 9.6 oz)   Height: 1.562 m (5' 1.5\")     Body mass index is 33.58 kg/m .    General: Alert, well appearing, no acute distress  Eyes: PERRL, EOMI, normal conjunctiva  HENT: " Normocephalic, atraumatic; moist mucous membranes, no oropharyngeal erythema  Neck: No tenderness, no lymphadenopathy  Lungs: Clear to auscultation bilaterally, no wheezing, no rhonchi, no crackles  CV: Regular rate and rhythm, no murmur, no rubs, no gallops  Abdomen: Soft, nontender, non-distended, no masses palpated, normal bowel sounds  Extremities: Able to move all extremities, nontender, normal strength  Neuro: Grossly normal  Skin: Dry, no cyanosis, no abrasions, no discoloration.    No results found for this or any previous visit (from the past 24 hour(s)).

## 2021-03-09 ENCOUNTER — ALLIED HEALTH/NURSE VISIT (OUTPATIENT)
Dept: FAMILY MEDICINE | Facility: CLINIC | Age: 31
End: 2021-03-09
Payer: MEDICAID

## 2021-03-09 ENCOUNTER — OFFICE VISIT (OUTPATIENT)
Dept: FAMILY MEDICINE | Facility: CLINIC | Age: 31
End: 2021-03-09
Payer: MEDICAID

## 2021-03-09 VITALS
HEIGHT: 62 IN | HEART RATE: 70 BPM | TEMPERATURE: 98.4 F | RESPIRATION RATE: 18 BRPM | BODY MASS INDEX: 32.39 KG/M2 | DIASTOLIC BLOOD PRESSURE: 75 MMHG | WEIGHT: 176 LBS | SYSTOLIC BLOOD PRESSURE: 108 MMHG | OXYGEN SATURATION: 98 %

## 2021-03-09 DIAGNOSIS — O21.0 MORNING SICKNESS: ICD-10-CM

## 2021-03-09 DIAGNOSIS — Z34.00 SUPERVISION OF NORMAL FIRST PREGNANCY, ANTEPARTUM: ICD-10-CM

## 2021-03-09 DIAGNOSIS — Z34.01 ENCOUNTER FOR SUPERVISION OF NORMAL FIRST PREGNANCY IN FIRST TRIMESTER: Primary | ICD-10-CM

## 2021-03-09 LAB
BILIRUBIN UR: NEGATIVE MG/DL
BLOOD UR: ABNORMAL MG/DL
ERYTHROCYTE [DISTWIDTH] IN BLOOD BY AUTOMATED COUNT: 12.6 % (ref 11–14.5)
GLUCOSE URINE: NEGATIVE
HCT VFR BLD AUTO: 41.8 % (ref 35–47)
HGB BLD-MCNC: 13.9 G/DL (ref 12–16)
HIV 1+2 AB+HIV1 P24 AG SERPL QL IA: NEGATIVE
KETONES UR QL: NEGATIVE MG/DL
LEUKOCYTE ESTERASE UR: ABNORMAL
MCH RBC QN AUTO: 28.4 PG (ref 27–34)
MCHC RBC AUTO-ENTMCNC: 33.3 G/DL (ref 32–36)
MCV RBC AUTO: 86 FL (ref 80–100)
NITRITE UR QL STRIP: NEGATIVE MG/DL
PH UR STRIP: 7 [PH] (ref 4.5–8)
PLATELET # BLD AUTO: 321 THOU/UL (ref 140–440)
PMV BLD AUTO: 9.7 FL (ref 8.5–12.5)
PROTEIN UR: NEGATIVE MG/DL
RBC # BLD AUTO: 4.89 MILL/UL (ref 3.8–5.4)
SP GR UR STRIP: 1.02 (ref 1–1.03)
UROBILINOGEN UR STRIP-ACNC: ABNORMAL E.U./DL
WBC # BLD AUTO: 12 THOU/UL (ref 4–11)

## 2021-03-09 PROCEDURE — 99213 OFFICE O/P EST LOW 20 MIN: CPT | Mod: GC | Performed by: FAMILY MEDICINE

## 2021-03-09 PROCEDURE — 81003 URINALYSIS AUTO W/O SCOPE: CPT | Performed by: FAMILY MEDICINE

## 2021-03-09 PROCEDURE — 99207 PR NO CHARGE NURSE ONLY: CPT

## 2021-03-09 PROCEDURE — 36415 COLL VENOUS BLD VENIPUNCTURE: CPT | Performed by: FAMILY MEDICINE

## 2021-03-09 RX ORDER — PROCHLORPERAZINE MALEATE 10 MG
10 TABLET ORAL EVERY 6 HOURS PRN
Qty: 60 TABLET | Refills: 1 | Status: SHIPPED | OUTPATIENT
Start: 2021-03-09 | End: 2021-05-04

## 2021-03-09 RX ORDER — ONDANSETRON 4 MG/1
4-8 TABLET, ORALLY DISINTEGRATING ORAL EVERY 8 HOURS PRN
Qty: 90 TABLET | Refills: 1 | Status: SHIPPED | OUTPATIENT
Start: 2021-03-09 | End: 2021-05-04

## 2021-03-09 SDOH — ECONOMIC STABILITY: TRANSPORTATION INSECURITY
IN THE PAST 12 MONTHS, HAS LACK OF TRANSPORTATION KEPT YOU FROM MEETINGS, WORK, OR FROM GETTING THINGS NEEDED FOR DAILY LIVING?: NO

## 2021-03-09 SDOH — ECONOMIC STABILITY: INCOME INSECURITY: HOW HARD IS IT FOR YOU TO PAY FOR THE VERY BASICS LIKE FOOD, HOUSING, MEDICAL CARE, AND HEATING?: NOT HARD AT ALL

## 2021-03-09 SDOH — ECONOMIC STABILITY: FOOD INSECURITY: WITHIN THE PAST 12 MONTHS, YOU WORRIED THAT YOUR FOOD WOULD RUN OUT BEFORE YOU GOT MONEY TO BUY MORE.: NEVER TRUE

## 2021-03-09 SDOH — ECONOMIC STABILITY: TRANSPORTATION INSECURITY
IN THE PAST 12 MONTHS, HAS THE LACK OF TRANSPORTATION KEPT YOU FROM MEDICAL APPOINTMENTS OR FROM GETTING MEDICATIONS?: NO

## 2021-03-09 SDOH — ECONOMIC STABILITY: FOOD INSECURITY: WITHIN THE PAST 12 MONTHS, THE FOOD YOU BOUGHT JUST DIDN'T LAST AND YOU DIDN'T HAVE MONEY TO GET MORE.: NEVER TRUE

## 2021-03-09 ASSESSMENT — MIFFLIN-ST. JEOR: SCORE: 1453.64

## 2021-03-09 NOTE — PROGRESS NOTES
First Obstetric Visit       HPI       Diaz Raymond is a 31 year old woman who presents for an initial prenatal visit at 9w4d pregnant with BETHANY of  Oct 8, 2021 by by Patient's last menstrual period was 2021 (exact date)..  She has had bleeding since her LMP.  The bleeding  is bright red, in small, on 1 occasions, and was not accompanied by cramping; happened in early February. She has had mild nausea and dizziness throughout the day, not resolved by unisom and B6. Weight loss has not occurred.  This was a planned pregnancy.     OTHER CONCERNS: nausea and dizziness     Labor Risk Assessment   Is the patient's age <18 or >40?    No  Patint's BMI is Body mass index is 32.72 kg/m . Does patient have a BMI < 18.5?    No  If previous pregnancy, was delivery within previous 6 months?    No  Have you ever delivered a baby prior to 37 weeks gestation?    No  Did conception for this pregnancy occur via In Vitro Fertilization?    No  Are you carrying twins?    No    Summary:  Patient is Average/high risk for  Labor (verify Problem List includes V23.9 and note risk factor(s) in the Comments)  The patient has the following risk factors for  labor:  None  Average risk pregnancy  Past Medical History  Past Medical History:   Diagnosis Date     Known health problems: none        Do you have a history of any of the following medical conditions?    Condition Yes/No   Hypertension No   Heart disease, mitral valve prolapse, rheumatic fever No   Asthma or another chronic lung disease No   An autoimmune disorder No   Kidney disease No   Frequent urinary tract infections No   Migraine headaches No   Stroke, loss of sensation/function, seizures, or other neuro problem No   Diabetes No   Thyroid problems or have you taken thyroid medication No   Hepatitis, liver disease, jaundice No   Blood clots, phlebitis, pulmonary embolism or varicose veins No   Excessive bleeding after surgery or dental work No   Heavy  menstrual periods requiring treatment No   Anemia No   Blood transfusions No        Would you refuse a blood transfusion? No   Breast problems No   Abnormalities of the uterus No   Abnormal pap smear No   Have you been treated for an emotional disturbance? No   Have you been physically, sexually, or emotionally hurt by someone? No   Have you been in a major accident or suffered serious trauma? No   Have you had surgical procedures? No        Have you ever had any complications from anesthesia? No   Have you ever been hospitalized for a nonsurgical reason? No     Notes for positives no positives     Prenatal Genetic Screening    Do you have a history of any of the following Yes/No        A metabolic disorder (e.g. Insulin-dependent DM, PKU) No        Recurrent pregnancy loss No     Do you, the baby's father, or anyone in your families have Yes/No        Thalassemia AND MCV <80 No        Hemophilia No        Neural tube defect No        Congenital heart defect No        Sickle cell disease or trait No        Muscular dystrophy No        Cystic fibrosis No        Mental retardation or autism No        Down's syndrome No        Jossue-Sach's disease No        Coweta's chorea No        Any other inherited genetic or chromosomal disorder No        A child with birth defects not listed above No     Infection History    At high risk for coming in contact with HIV No   Ever treated for tuberculosis No   Ever received the BCG vaccine for tuberculosis No   Ever had genital herpes (or has your partner) No   Had a rash or viral illness since LMP No   Ever had a sexually transmitted infection No   Ever had chicken pox or the vaccine Yes   Ever had any other serious infectious disease No   Up to date with immunizations Yes       Habits  Non-smoker, No ETOH, Street drugs none and No exercise because she has been very nauseous and dizzy since becoming pregnant.     Current medications are:  Current Outpatient Medications  "  Medication Sig Dispense Refill     Prenatal Vit-Fe Fumarate-FA (PRENATAL MULTIVITAMIN W/IRON) 27-0.8 MG tablet Take 1 tablet by mouth daily 90 tablet 3     pyridOXINE (VITAMIN B6) 25 MG tablet Take 1 tablet (25 mg) by mouth 3 times daily as needed (nausea) 60 tablet 11     doxylamine (UNISOM) 25 MG TABS tablet Take 1 tablet (25 mg) by mouth At Bedtime 60 tablet 4       REVIEW OF SYSTEMS  ENT: NEGATIVE for ear, mouth and throat problems  CV: NEGATIVE for chest pain, palpitations or peripheral edema  GI: NEGATIVE for nausea, abdominal pain, heartburn, or change in bowel habits  C: NEGATIVE for fever, chills, change in weight  I: NEGATIVE for worrisome rashes, moles or lesions  E: NEGATIVE for vision changes or irritation  R: NEGATIVE for significant cough or SOB  B: NEGATIVE for masses, tenderness or discharge  M: NEGATIVE for significant arthralgias or myalgia  N: NEGATIVE for weakness, dizziness or paresthesias  P: NEGATIVE for changes in mood or affect  ====================================================    PHYSICAL EXAM:  /75 (BP Location: Left arm, Patient Position: Sitting, Cuff Size: Adult Regular)   Pulse 70   Temp 98.4  F (36.9  C) (Tympanic)   Resp 18   Ht 1.562 m (5' 1.5\")   Wt 79.8 kg (176 lb)   LMP 01/01/2021 (Exact Date)   SpO2 98%   BMI 32.72 kg/m         GENERAL:  Pleasant pregnant female, alert, well groomed.  SKIN:  Warm and dry, without lesions or rashes  HEAD: Symmetrical features.  EYES:  PERRL, EOMI.  MOUTH:  Buccal mucosa pink, moist without lesions.    NECK:  Thyroid without enlargement and nodules.  Lymph nodes not palpable.  LUNGS:  Clear to auscultation.  BREAST:  Symmetrical.  No dominant, fixed or suspicious masses are noted.  No skin or nipple changes or axillary nodes.  Nipples everted     HEART:  RRR without murmur.  ABDOMEN: Soft without masses, tenderness or organomegaly.   No scars noted.. FHT not attempted.   MUSCULOSKELETAL:  Full range of motion  EXTREMITIES:  No " edema. No significant varicosities.   GENITALIA:  BUS WNL, no lesions noted   VAGINA:  Pink, normal rugae and discharge normal and physiologic, yellowish  CERVIX:  Smooth; closed. 0.5 cm erythematous sessile lesion at 11 o'clock to 2 o'clock around os. Mild bleeding with manipulation.   firm/ closed 2 cm long.  UTERUS: Midposition, negative cervical motion tenderness  ADNEXA: Without masses or tenderness  =========================================    ASSESSMENT/PLAN         1. Cervical lesion- suspect polyp. Did pap and made sure to get some of the lesion on the scraping. Cervix otherwise looked normal. Could explain the early pregnancy bleeding that she had last month.   - will see what gyn cytology results are  - want to re-evaluate with speculum exam  at next visit with faculty present in the room to evaluate as well    2. Normal first trimester pregnancy; 9 weeks by early US. Nauseous- unisom plus b6 not working. Prescribed compazine, counseled about sleepiness. Also prescribed zofran for last line if compazine too drowsy/not working; counseled on theoretic risk of cleft palate and heart defects    Average risk pregnancy  Recommended weight gain: 15-25 lbs.  Instructed on best evidence for: weight gain for her BMI for pregnancy; healthy diet and foods to avoid; exercise and activity during pregnancy;  avoiding exposure to toxoplasmosis; and maintenance of a generally healthy lifestyle.   Discussed the harms, benefits, side effects and alternative therapies for current prescribed and OTC medications.    Options for treatment and follow-up care were reviewed with the patient and/or guardian. Diaz PRINGLE Segundo and/or guardian engaged in the decision making process and verbalized understanding of the options discussed and agreed with the final plan.    Discussed patient with our OB coordinator.     Patient was seen with my attending, Dr. Morales, who agrees with the assessment and plan.     Luzmaria Barnes MD

## 2021-03-09 NOTE — PATIENT INSTRUCTIONS
Compazine (prochlorperazine) might make you sleepy- try at night first    Zofran (odansetron) is the one to try last if the others aren't working- very small increased risk of cleft palate and heart defects, but the data isn't great to link that to the zofran as what did it  - can take 1-2 tablets, they can dissolve in your mouth

## 2021-03-09 NOTE — PROGRESS NOTES
Past Medical History     Do you have a history of any of the following medical conditions?    Condition No/Yes/Details   Hypertension No    Heart disease, mitral valve prolapse, rheumatic fever No    Asthma or another chronic lung disease No    An autoimmune disorder No    Kidney disease No    Frequent urinary tract infections No    Epilepsy, seizures, or spells No    Migraine headaches No    Stroke, loss of sensation/function, seizures, or other neuro problem No    Diabetes No    Thyroid problems or have you taken thyroid medication No    Hepatitis, liver disease, jaundice No    Blood clots, phlebitis, pulmonary embolism or varicose veins No    Excessive bleeding after surgery or dental work No    Do you have more bleeding than other women after a cut or a scratch? No    Anemia No    Blood transfusions No         Would you refuse a blood transfusion?       No   If yes, then ask next question. If no, skip next question.   Would you rather die than receive a blood transfusion? No    Breast problems No    Have you ever ? No plans to breast and bottle   Abnormalities of the uterus No    Abnormal pap smear No    Have you ever been treated for depression? No    Are you having problems with crying spells or loss of self-esteem? No    Have you ever required psychiatric care? No    Have you been physically, sexually, or emotionally hurt by someone? No    Have you been in a major accident or suffered serious trauma? No    Have you ever had any gynecological surgical procedures such as cervical conization, LEEP, laser treatment, cryosurgery of the cervix, or a dilatation and curettage? No    Have you had any other surgical procedures? No         Have you ever had any complications from anesthesia? No    Have you ever been hospitalized for a nonsurgical reason? No             Substance use and exposure     Does anyone in your home smoke? No    Do you use tobacco products or betel nut? No    Do you drink beer,  wine, or hard liquor? No    Do you use any of the following: marijuana, speed, cocaine, heroin, hallucinogens, or other drugs? No               Symptoms since Last Menstrual Period     Do you currently have any of the following symptoms: No/Yes/Details        Abdominal pain No         Blood in the stools or urine No         Chest pain No         Shortness of breath No         coughing or vomiting up blood No         Your heart racing or skipping beats No         Nausea or vomiting  YES nausea several times per day and  vomiting twice a day        Pain on urination No         Vaginal discharge or bleeding  YES 1st trimester vaginal bleeding for 3 days starting on 21               Genetic Screening          Is the patient 35 years or older? No      Do you have a history of any of the following No/Yes/Details        A metabolic disorder (e.g. Insulin-dependent DM, PKU) No         Recurrent pregnancy loss or still birth No      Do you, the baby's father, or anyone in your families have          Thalassemia AND MCV <80 No         Hemophilia No         Neural tube defect No }        Congenital heart defect No         Sickle cell disease or trait No         Muscular dystrophy No         Cystic fibrosis No         Mental retardation or autism No         Down's syndrome No         Jossue-Sach's disease No         Duy's chorea No         Any other inherited genetic or chromosomal disorder No         A child with birth defects not listed above No                Infection History     Ever treated for tuberculosis or had a positive skin test No    Ever had genital herpes (or has your partner) No    Had a rash or viral illness since LMP No    Ever had a sexually transmitted infection No    Ever had chicken pox or the vaccine No    Have you had a sexual partner who is HIV positive No    Ever had any other serious infectious disease No                Risk Assessment     Average Risk Category  No significant risk  factors: No    At Risk Category (up to 3)  Teen pregnancy: No  Poor social situation: No  Domestic abuse: No  Financial difficulties: No  Smoker: No  H/O  deliver: No  H/O drug abuse: No  Non-English speaking: No  Advanced maternal age: No  GDM risks: No  Previous C/S: No  H/O PIH: No  H/O STIs: No  H/O mental health concerns: No  Onset care > 20 weeks: No      High Risk Category (4 or more At Risk or)  Diabetes/GDM: No  Multiple gestation: No  Chronic hypertension: No  Significant hx of asthma: No  Fetal demise > 20 weeks: No  Positive tox screen: No  Current mental health treatment: No      Risk: Average Risk   Date determined: 3/9/21

## 2021-03-09 NOTE — LETTER
March 30, 2021      Diaz Raymond  [address] [place] [address]          Dear Diaz Raymond     The results from your labs showed that you are negative for all the diseases we check pregnant patients for, which is good.  You are immune to chicken pox and measles, which means you don't need to be vaccinated when baby is born. Your white blood cell count was a little bit high, which sometimes can happen with stress (like the stress of feeling nauseous and sick at the beginning of pregnancy); we don't need to recheck it.      Your pap smear showed that the cervix cells looked normal under the microscope, with no signs of cancer. You were also negative for HPV, which is a virus that can increase the risk of cervical cancer- that is also good. I'm guessing that the abnormality I saw on your cervix is something called a cervical polyp, which is like a little skin tag on your cervix. At one of your next appointments, I'd like to look at it again with one of my supervisors to make sure that's what they think it is as well, if that's okay with your. Cervical polyps can also cause some vaginal bleeding, which could be why you had a little bit of bleeding at the beginning of pregnancy.      Thank you for allowing me to be a part of your health care!         Resulted Orders   ABO/Rh Type-HML ([facility])   Result Value Ref Range    ABO/Rh(D) A POS     Repeat ABO/Rh Typing ([facility]) A POS     Narrative    Test performed by:   [facility]  [address], [place] [address]   [facility]   Result Value Ref Range    Antibody Screen Negative     Narrative    Test performed by:   [facility]  [address], [place] [address]   Chlamydia/Gono Amplified [facility])   Result Value Ref Range    Chlamydia trac,Amplified Prb Negative Negative    N gonorrhoeae,Amplified Prb Negative Negative    Narrative    Test performed by:  [facility] [address] [place] [address]   Culture  Urine ([facility])   Result Value Ref Range    Culture SEE RESULTS BELOW       Comment:      CULTURE, URINE   SOURCE: Urine, Clean Catch   CULTURE RESULTS:    No Growth      Narrative    Test performed by:  [facility] [address] [place] [address]   Hepatitis B Surface Ag ([facility])   Result Value Ref Range    Hepatitis B Surface Antigen Negative Negative    Narrative    Test performed by:  [facility] [address] [place] [address]   HIV Ag/Ab Screen [place] [facility]   Result Value Ref Range    HIV Antigen/Antibody Negative Negative    Narrative    Test performed by:  [facility] [address] [place] [address]  Method is Abbott HIV Ag/Ab for the detection of HIV p24 antigen, HIV-1   antibodies and HIV-2 antibodies.   Lead, Blood ([facility])   Result Value Ref Range    Lead See Note.       Comment:      Reflex testing sent to [facility]. Result to be reported on the   separate reflexed test code.      Collection Method Venous     Narrative    Test performed by:  [facility] [address] [place] [address]   Rubella  IgG ([facility])   Result Value Ref Range    Rubella IgG Positive     Narrative    Test performed by:  [facility] [address] [place] [address]  Negative: Absence of detectable rubella virus IgG antibodies. A negative   result presumes that immunity has not been acquired.   Equivocal: Suggest recollection.  Positive: Considered positive for IgG antibodies to rubella virus.   Syphilis Screen [place] ([facility])   Result Value Ref Range    Treponema Antibody (Syphilis) Negative Negative    Narrative    Test performed by:  [facility] [address] [place] [address]   Urinalysis([facility])   Result Value Ref Range    Specific Gravity Urine 1.025 1.005 - 1.030     pH Urine 7.0 4.5 - 8.0    Leukocyte Esterase UR 2+ (A) NEGATIVE    Nitrite Urine Negative NEGATIVE mg/dL    Protein UR Negative NEGATIVE mg/dL    Glucose Urine Negative NEGATIVE    Ketones Urine Negative NEGATIVE mg/dL    Urobilinogen mg/dL 1.0 E.U./dL (A) 0.2 E.U./dL E.U./dL    Bilirubin UR Negative NEGATIVE mg/dL    Blood UR Trace-intact (A) NEGATIVE mg/dL   GYN Cytology ([facility])   Result Value Ref Range    Lab AP Case Report SEE RESULTS BELOW       Comment:      Gynecologic Cytology Report                       Case: [id number]                                     Authorizing Provider:  Sabiha Morales MD      Collected:             03/09/2021 1514              Ordering Location:     [facility]      Received:              03/10/2021 [address] [place] [address]                                                                                     Laboratory                                                                     First Screen:          Tonya Solorzano CT                                                                                 (ASCP)                                                                         Rescreen:              Bertha Buitrago CT                                                                              (ASCP)                                                                         Specimen:    SUREPATH PAP, SCREENING, Endocervical/cervical                                                Lab AP Gyn Interpretation SEE RESULTS BELOW Negative for squamous intraepithelial le      Comment:      Negative for squamous intraepithelial lesion or malignancy  Electronically signed by Bertha Buitrago CT (ASCP) on 3/16/2021 at 11:32   AM      Lab AP Malignant? Normal Normal    Specimen adequacy: SEE RESULTS BELOW       Comment:      Satisfactory for evaluation, endocervical/transformation zone component   present  Partially obscuring  inflammation      HPV Reflex? Yes regardless of result     High Risk? No     Last Mens Period 1/1/21     Lab AP Abnormal Bleeding Yes     Lab AP Patient Status pregnant     Lab AP Birth Control/Hormones None     Lab AP Previous Normal never     Lab AP Previous Abnl no     Lab AP Cervical Appearance       abnormal- pedunculated red lesion at 11:00-2:00 around os    Narrative    Test performed by:  [facility]  [address] [place] [address]   Karen Zoster Imm Status Gege ([facility])   Result Value Ref Range    V.zoster Immune Status Positive     Narrative    Test performed by:  [facility] [address] [place] [address]  Assay interference due to circulating antibodies against HIV, Hepatitis A,   Hepatitis B, Hepatitis C, HAMA and Rheumatoid Factor has not been evaluated.  The assay performance in detecting antibodies to Varicella Zoster in   individuals vaccinated with the FDA-licensed VZV vaccine has not been   established.  Negative: Absence of detectable Varicella Zoster IgG antibodies. A negative   result indicates no detectable VZV antibody, but does not rule out acute   infection. It should be noted that the test usually scores negative in   infected patients during the incubation period and the early stages of   infection.  Equivocal: Suggest recollection.  Positive: Presence of detectable Varicella Zoster IgG antibodies. A positive   result generally indicates exposure to the pathogen or administration of   specific immune-globulins, but it is no indication of active infection or   stage of disease.   CBC w/ Plt. ([facility])   Result Value Ref Range    WBC 12.0 (H) 4.0 - 11.0 thou/uL    RBC 4.89 3.80 - 5.40 mill/uL    Hemoglobin 13.9 12.0 - 16.0 g/dL    Hematocrit 41.8 35.0 - 47.0 %    MCV 86 80 - 100 fL    MCH 28.4 27.0 - 34.0 pg    MCHC 33.3 32.0 - 36.0 g/dL    RDW 12.6 11.0 - 14.5 %    Platelets 321 140 - 440 thou/uL    Mean  "Platelet Volume 9.7 8.5 - 12.5 fL    Narrative    Test performed by:  [facility]  [address] [place] [address]   Lead With Demographics ([facility])   Result Value Ref Range    Lead, Blood (Venous) <2.0 <=4.9 ug/dL      Comment:      INTERPRETIVE INFORMATION: Lead, Blood (Venous)  Elevated results may be due to skin or collection-related   contamination, including the use of a noncertified lead-free tube.   If contamination concerns exist due to elevated levels of blood   lead, confirmation with a second specimen collected in a certified   lead-free tube is recommended.  Information sources for reference intervals and interpretive   comments include the \"CDC Response to the 2012 Advisory Committee   on Childhood Lead Poisoning Prevention Report\" and the   \"Recommendations for Medical Management of Adult Lead Exposure,   Environmental Health Perspectives, 2007.\" Thresholds and time   intervals for retesting, medical evaluation, and response vary by   state and regulatory body. Contact your State Department of Health   and/or applicable regulatory agency for specific guidance on   medical management recommendations.   Age            Concentration   Comment  All ages       5-9.9 ug/dL     Adverse health effects are                                   possible, particularly in                                 children under 6 years of                                 age and pregnant women.                                 Discuss health risks                                 associated with continued                                 lead exposure. For children                                 and women who are or may                                 become pregnant, reduce                                 lead exposure.               All ages        10-19.9 ug/dL  Reduced lead exposure and                                 increased biological                                 monitoring are " recommended.  All ages        20-69.9 ug/dL  Removal from lead exposure                                 and prompt medical                                 evaluation are recommended.                                 Consider chelation therapy                                 when concentrations exceed                                  50 ug/dL and symptoms of                                 lead toxicity are present.  Less than 19     Greater than  Critical. Immediate medical  years of age     44.9 ug/dL    evaluation is recommended.                                 Consider chelation therapy                                  when symptoms of lead                                 toxicity are present.  Greater than 19  Greater than  Critical. Immediate medical  years of age     69.9 ug/dL    evaluation is recommended                                 Consider chelation therapy                                 when symptoms of lead                                  toxicity are present.  This test was developed and its performance characteristics   determined by [facility]. It has not been cleared or   approved by the US Food and Drug Administration. This test was   performed in a CLIA certified laboratory and is intended for   clinical purposes.  Performed By: [facility]  [address] [place] [id number]  : Silvia Mujica MD      Narrative    Test performed by:  [facility]  [address] [place] [id number]   HPV Acushnet PCR ([email or web address])   Result Value Ref Range    HPV Source SurePath     HPV 16 DNA Negative NEG    HPV 18 DNA Negative NEG    Other HR HPV Negative NEG    Final Diagnosis SEE NOTES       Comment:      This patient's sample is negative for HPV DNA.  This test was developed and its performance characteristics determined by the  [facility]. It  has not been cleared or approved by the FDA.  The laboratory is regulated under  CLIA as qualified to perform high-complexity testing. This test is used for  clinical purposes. It should not be regarded as investigational or for  research.  (Note)  METHODOLOGY:  The Roche cesar 4800 system uses automated extraction,  simultaneous amplification of HPV (L1 region) and beta-globin,  followed by  real time detection of fluorescent labeled HPV and beta  globin using specific oligonucleotide probes . The test specifically  identifies types HPV 16 DNA and HPV 18 DNA while concurrently  detecting the rest of the high risk types (31, 33, 35, 39, 45, 51,  52, 56, 58, 59, 66 or 68).  COMMENTS:  This test is not intended for use as a screening device  for women under age 30 with normal cervical cy tology.  Results should  be correlated with cytologic and histologic findings. Close clinical  followup is recommended.      Specimen Description Cervical Cells       Comment:      Performed and/or entered by:  [facility]  [address] [place] [address]       Narrative    Test performed by:  [facility]  [address] [facility], [address], [place] [address]       If you have any questions or concerns, please call the clinic at the number listed above.       Sincerely,      Sabiha Morales MD

## 2021-03-09 NOTE — PROGRESS NOTES
Preceptor Attestation:   Patient seen, evaluated and discussed with the resident. I have verified the content of the note, which accurately reflects my assessment of the patient and the plan of care.   Supervising Physician:  Sabiha Morales MD

## 2021-03-10 LAB
ABO + RH BLD: NORMAL
BLD GP AB SCN SERPL QL: NEGATIVE
FINAL DIAGNOSIS: NORMAL
HBV SURFACE AG SERPL QL IA: NEGATIVE
HPV HR 12 DNA CVX QL NAA+PROBE: NEGATIVE
HPV16 DNA SPEC QL NAA+PROBE: NEGATIVE
HPV18 DNA SPEC QL NAA+PROBE: NEGATIVE
REPEAT ABO/RH TYPING (HML): NORMAL
RUBV IGG SERPL QL IA: POSITIVE
SPECIMEN DESCRIPTION: NORMAL
SPECIMEN SOURCE CVX/VAG CYTO: NORMAL
TREPONEMA ANTIBODY (SYPHILIS): NEGATIVE
VZV IGG SER QL IF: POSITIVE

## 2021-03-11 PROBLEM — Z34.00 SUPERVISION OF NORMAL FIRST PREGNANCY: Status: ACTIVE | Noted: 2021-03-09

## 2021-03-11 LAB
C TRACH RRNA CVX QL NAA+PROBE: NEGATIVE
CULTURE: NORMAL
N GONORRHOEA RRNA SPEC QL NAA+PROBE: NEGATIVE

## 2021-03-11 NOTE — PROGRESS NOTES
Family Medicine OB Education    I provided the following OB education to Diaz Raymond.    Discussed that Dr Booth should be the doctor that she sees for her prenatal visits and that Dr Booth will try hard to be the one to deliver her baby.  Discussed that if Dr Booth was unavailable that one of our other physicians would deliver the baby and that this could be a male or female provider.  Briefly discussed residency program and that multiple doctors would be present at time of delivery.  Sheet given and discussed fetal growth and development.  Sheet given and discussed warning signs with reasons to call clinic, L&D, or 24 hr HE  line with questions or concerns (phone numbers given).  Sheet given and discussed Tdap to be given after 27 weeks gestation and the importance of family members get immunized before delivery.  Proof of pregnancy completed and given to pt.  Gave pt preregistration form for hospital to complete.  See questionnaire and pregnancy risk assessment for further information in provider encounter.     Legal Screener completed and there were no concerns for government benefits, housing, or immigration.      Name of provider who requested the OB education: Dr Booth  Name of provider on site (faculty or community preceptor) at the time of performing the OB education: Dr Andrew Liao, RN, BSN

## 2021-03-12 ENCOUNTER — TELEPHONE (OUTPATIENT)
Dept: FAMILY MEDICINE | Facility: CLINIC | Age: 31
End: 2021-03-12

## 2021-03-12 NOTE — TELEPHONE ENCOUNTER
Pt states that her rxs fr nausea were not covered by insurance and would cost more than $700.  She states that she was nauseated all day yesterday and vomits after eating most anything.  She has noticed a small amount of blood in the vomit.  She has been trying to eat some banana or apples every 1 hour but still nauseated. Discussed trying other things such as saltine crackers, toast, juice, lemonade, ginger ale with a goal of eating small frequent meals and constantly sipping on fluids.    Called [facility] and RAHUL insurance is not matching up in their system and pt is registered as nonbinary instead of female so rejecting rx.  Pharm Tech will work on this.  Told her nurse will call pt to verify insurance and will call the pharmacy back.    Pt states that on 3/1/21 she called the [facility] to let them know that she was pregnant and they told her that they would put her into [employer] MA.    Jamaica at  ran insurance and UCARE is not active and MA is not active.    Called pt back and let her know.  Asked her to call the [facility] to let them know that her insurance is not coming up active and that she needs to be able to get nausea medication.  Asked her to call back once this has been done so that the pharmacy can be notified.  Pt verbalized understanding.    Called [facility] and gave them the update.  Told them that nurse will call back with updated insurance info./GUILLERMO

## 2021-03-12 NOTE — TELEPHONE ENCOUNTER
[facility] phone call message- general phone call:    Reason for call: She needs a call back.    Action desired: call back.    Return call needed: Yes    OK to leave a message on voice mail? Yes    Advised patient to response may take up to 2 business days: Yes    Primary language: English      needed? No    Call taken on March 12, 2021 at 9:25 AM by Rosemarie Soares

## 2021-03-12 NOTE — TELEPHONE ENCOUNTER
Pt states that she called [facility] and they told her that her insurance was closed out and told her to call [facility].  She states that she called the [facility] and her new PMI # is [id number].    Called [facility] and PMI # given.  Pt has a $0 copay and can come now to  rxs.    Called pt and gave her info.  Told her to call if she continues to have N&V despite taking the medication or if she has other questions or concerns./NG

## 2021-03-13 LAB
COLLECTION METHOD: NORMAL
LEAD BLD-MCNC: NORMAL UG/DL
LEAD BLDV-MCNC: <2 UG/DL

## 2021-03-16 ENCOUNTER — RECORDS - HEALTHEAST (OUTPATIENT)
Dept: ADMINISTRATIVE | Facility: OTHER | Age: 31
End: 2021-03-16

## 2021-03-16 LAB
CYTOLOGY CVX/VAG DOC THIN PREP: NORMAL
HIGH RISK?: NO
HPV REFLEX?: NORMAL
LAB AP ABNORMAL BLEEDING: YES
LAB AP BIRTH CONTROL/HORMONES: NORMAL
LAB AP CERVICAL APPEARANCE: NORMAL
LAB AP PATIENT STATUS: NORMAL
LAB AP PREVIOUS ABNL: NO
LAB AP PREVIOUS NORMAL: NORMAL
LAST MENS PERIOD: NORMAL
PATH REPORT.COMMENTS IMP SPEC: NORMAL
PATH REPORT.COMMENTS IMP SPEC: NORMAL
SPECIMEN ADEQUACY:: NORMAL

## 2021-04-09 ENCOUNTER — OFFICE VISIT (OUTPATIENT)
Dept: FAMILY MEDICINE | Facility: CLINIC | Age: 31
End: 2021-04-09
Payer: COMMERCIAL

## 2021-04-09 VITALS
HEIGHT: 62 IN | TEMPERATURE: 98.3 F | SYSTOLIC BLOOD PRESSURE: 109 MMHG | RESPIRATION RATE: 14 BRPM | WEIGHT: 175 LBS | DIASTOLIC BLOOD PRESSURE: 77 MMHG | HEART RATE: 87 BPM | BODY MASS INDEX: 32.2 KG/M2

## 2021-04-09 DIAGNOSIS — O21.0 MORNING SICKNESS: ICD-10-CM

## 2021-04-09 DIAGNOSIS — Z32.01 PREGNANCY TEST POSITIVE: ICD-10-CM

## 2021-04-09 DIAGNOSIS — Z34.00 SUPERVISION OF NORMAL FIRST PREGNANCY, ANTEPARTUM: Primary | ICD-10-CM

## 2021-04-09 PROCEDURE — 99213 OFFICE O/P EST LOW 20 MIN: CPT | Mod: GC | Performed by: FAMILY MEDICINE

## 2021-04-09 RX ORDER — PRENATAL VIT/IRON FUM/FOLIC AC 27MG-0.8MG
1 TABLET ORAL DAILY
Qty: 90 TABLET | Refills: 3 | Status: SHIPPED | OUTPATIENT
Start: 2021-04-09 | End: 2021-06-08

## 2021-04-09 ASSESSMENT — MIFFLIN-ST. JEOR: SCORE: 1449.1

## 2021-04-09 NOTE — PATIENT INSTRUCTIONS
Unisom: try just half of a table    Zofran: start taking one tablet     Tylenol is safe.  Ibuprofen, motrin, alleve, are not.     Acid reflux: can take famotidine (pepcid)    [facility];  [phone].    Maternal Fetal Medicine  [address]     Phone: [phone]    4/12/21 MFM received order and their schedulers will call pt to schedule.
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
## 2021-04-09 NOTE — PROGRESS NOTES
Preceptor Attestation:    I discussed the patient with the resident and evaluated the patient in person. I have verified the content of the note, which accurately reflects my assessment of the patient and the plan of care.   Supervising Physician:  Bhavik Jesus MD.

## 2021-04-14 ENCOUNTER — AMBULATORY - HEALTHEAST (OUTPATIENT)
Dept: MATERNAL FETAL MEDICINE | Facility: HOSPITAL | Age: 31
End: 2021-04-14

## 2021-04-14 DIAGNOSIS — O26.90 PREGNANCY, ANTEPARTUM, COMPLICATIONS: ICD-10-CM

## 2021-04-15 ENCOUNTER — AMBULATORY - HEALTHEAST (OUTPATIENT)
Dept: MATERNAL FETAL MEDICINE | Facility: HOSPITAL | Age: 31
End: 2021-04-15

## 2021-04-16 ENCOUNTER — AMBULATORY - HEALTHEAST (OUTPATIENT)
Dept: LAB | Facility: HOSPITAL | Age: 31
End: 2021-04-16

## 2021-04-16 ENCOUNTER — OFFICE VISIT - HEALTHEAST (OUTPATIENT)
Dept: MATERNAL FETAL MEDICINE | Facility: HOSPITAL | Age: 31
End: 2021-04-16

## 2021-04-16 DIAGNOSIS — Z13.79 ENCOUNTER FOR GENETIC SCREENING FOR BIRTH DEFECT: ICD-10-CM

## 2021-04-16 DIAGNOSIS — O26.90 PREGNANCY, ANTEPARTUM, COMPLICATIONS: ICD-10-CM

## 2021-04-16 DIAGNOSIS — Z13.79 GENETIC TESTING: ICD-10-CM

## 2021-04-19 LAB
# FETUSES US: NORMAL
AFP MOM SERPL: 1.27
AFP SERPL-MCNC: 32 NG/ML
AGE - REPORTED: 32.8 YR
CURRENT SMOKER: NO
FAMILY MEMBER DISEASES HX: NO
GA METHOD: NORMAL
GA: NORMAL WK
IDDM PATIENT QL: NO
INTEGRATED SCN PATIENT-IMP: NORMAL
SPECIMEN DRAWN SERPL: NORMAL

## 2021-04-21 ENCOUNTER — COMMUNICATION - HEALTHEAST (OUTPATIENT)
Dept: MATERNAL FETAL MEDICINE | Facility: HOSPITAL | Age: 31
End: 2021-04-21

## 2021-04-21 DIAGNOSIS — Z34.00 SUPERVISION OF NORMAL FIRST PREGNANCY, ANTEPARTUM: Primary | ICD-10-CM

## 2021-04-21 DIAGNOSIS — N84.1 CERVICAL POLYP: ICD-10-CM

## 2021-04-23 ENCOUNTER — COMMUNICATION - HEALTHEAST (OUTPATIENT)
Dept: MATERNAL FETAL MEDICINE | Facility: HOSPITAL | Age: 31
End: 2021-04-23

## 2021-04-23 LAB — TRISOMY 21,18,13 - HE HISTORICAL: NORMAL

## 2021-05-04 ENCOUNTER — OFFICE VISIT (OUTPATIENT)
Dept: FAMILY MEDICINE | Facility: CLINIC | Age: 31
End: 2021-05-04
Payer: COMMERCIAL

## 2021-05-04 VITALS
WEIGHT: 179.2 LBS | TEMPERATURE: 98.3 F | HEART RATE: 86 BPM | OXYGEN SATURATION: 97 % | DIASTOLIC BLOOD PRESSURE: 76 MMHG | SYSTOLIC BLOOD PRESSURE: 112 MMHG | RESPIRATION RATE: 16 BRPM | BODY MASS INDEX: 33.31 KG/M2

## 2021-05-04 DIAGNOSIS — N84.1 CERVICAL POLYP: ICD-10-CM

## 2021-05-04 DIAGNOSIS — Z34.00 SUPERVISION OF NORMAL FIRST PREGNANCY, ANTEPARTUM: Primary | ICD-10-CM

## 2021-05-04 PROCEDURE — 99212 OFFICE O/P EST SF 10 MIN: CPT | Mod: GC | Performed by: FAMILY MEDICINE

## 2021-05-04 RX ORDER — LANOLIN ALCOHOL/MO/W.PET/CERES
100 CREAM (GRAM) TOPICAL DAILY
COMMUNITY

## 2021-05-04 NOTE — Clinical Note
Please review and close this encounter on behalf of the preceptor that is away for greater than 7 days. No additional attestation statement needed. Thank you, Whitley

## 2021-05-04 NOTE — PROGRESS NOTES
Preceptor Attestation:    I discussed the patient with the resident and evaluated the patient in person. I have verified the content of the note, which accurately reflects my assessment of the patient and the plan of care.   Supervising Physician:  Angeles Oconnell MD.

## 2021-05-04 NOTE — PROGRESS NOTES
Assessment & Plan  31 year old  at 17w4d with BETHANY Oct 8, 2021 based on LMP  8 week US    Diaz was seen today for prenatal care.    Diagnoses and all orders for this visit:    Supervision of normal first pregnancy, antepartum    Cervical polyp    Doing fetal survey with transvaginal to evaluate polyp in three weeks.     Weight gain inadequate: -0.363 kg (-12.8 oz) to date, out of recommended total of 15-25 lbs (pregravid BMI 25-29.9), but had significant nausea during first trimester and now making good progress back to baseline.     Patient Instructions   Famotidine aka pepcid    Blood pressure: shouldn't go over 130/80      Return to clinic in 4 weeks.    Luzmaria Barnes MD  I precepted today with Dr. Oconnell.     Review of external notes as documented elsewhere in note        Subjective  Concerns: none    Reviewed MFM notes: innatal normal. They ordered level II US for FAS, which is scheduled in a couple weeks.     ROS:  No - Headache  No - Changes in vision  No - Chest Pain  No - Shortness of Breath  No - Nausea   No - Vomiting  No - Abdominal pain- some acid reflux but no lower  No - Contractions  No - Dysuria   No - Vaginal Discharge    No - Vaginal bleeding   No - Loss of Fluid   YES - Extremity swelling- when walks a lot.   Absent - Fetal movement       Patient Active Problem List   Diagnosis     Supervision of normal first pregnancy       Diaz PRINGLE Segundo speaks English so an  was not used today.    Guidance:  OTC medications  weight gain and exercise  quickening  fetal survey ultrasound    Going to [facility]? No, doesn't qualify      Objective  /76   Pulse 86   Temp 98.3  F (36.8  C) (Oral)   Resp 16   Wt 81.3 kg (179 lb 3.2 oz)   LMP 2021 (Exact Date)   SpO2 97%   Breastfeeding No   BMI 33.31 kg/m    No distress.  Gravid abdomen.  -160.  Fundal height 18 cm.  trace edema.    Results  Blood type: A POS  No results found for any visits on 21.

## 2021-05-10 ENCOUNTER — TELEPHONE (OUTPATIENT)
Dept: FAMILY MEDICINE | Facility: CLINIC | Age: 31
End: 2021-05-10

## 2021-05-10 NOTE — TELEPHONE ENCOUNTER
[facility] phone call message-patient reporting a symptom:     Symptom: Pt was bleeding last Friday and is wondering if this is normal or what she should do.    Same Day Visit Offered: no    Additional comments: none    OK to leave message on voice mail? Yes    Primary language: English      needed? No    Call taken on May 10, 2021 at 2:37 PM by Shama Briceño

## 2021-05-10 NOTE — TELEPHONE ENCOUNTER
Pt is 18w3d pregnant and states that on Friday, 5/7/21 at 8:30 PM after she voided she noticed a small spot of brown blood on the toilet tissue.  She states that she voided 2 more times that night and noticed a small amount of pink.  She states that since she has not had any further vaginal bleeding/spotting.  She has not had sexual intercourse.  She denies cramping, UTI symptoms, and vag infection symptoms.  Told her to continue to monitor and call if has any further vaginal spotting/bleeding, cramping, UTI or vaginal infection symptoms.  Pt verbalized understanding.  Assisted her with scheduling HENOK with Dr Booth on 6/8/21 at 8:20 AM.  Routed note to Dr Booth./GUILLERMO

## 2021-05-14 ENCOUNTER — AMBULATORY - HEALTHEAST (OUTPATIENT)
Dept: MATERNAL FETAL MEDICINE | Facility: HOSPITAL | Age: 31
End: 2021-05-14

## 2021-05-20 ENCOUNTER — OFFICE VISIT - HEALTHEAST (OUTPATIENT)
Dept: MATERNAL FETAL MEDICINE | Facility: HOSPITAL | Age: 31
End: 2021-05-20

## 2021-05-20 ENCOUNTER — RECORDS - HEALTHEAST (OUTPATIENT)
Dept: ADMINISTRATIVE | Facility: OTHER | Age: 31
End: 2021-05-20

## 2021-05-20 ENCOUNTER — RECORDS - HEALTHEAST (OUTPATIENT)
Dept: ULTRASOUND IMAGING | Facility: HOSPITAL | Age: 31
End: 2021-05-20

## 2021-05-20 DIAGNOSIS — O43.112: ICD-10-CM

## 2021-05-20 DIAGNOSIS — Z13.79 ENCOUNTER FOR OTHER SCREENING FOR GENETIC AND CHROMOSOMAL ANOMALIES: ICD-10-CM

## 2021-05-21 PROBLEM — O43.119: Status: ACTIVE | Noted: 2021-05-20

## 2021-06-08 ENCOUNTER — OFFICE VISIT (OUTPATIENT)
Dept: FAMILY MEDICINE | Facility: CLINIC | Age: 31
End: 2021-06-08
Payer: COMMERCIAL

## 2021-06-08 VITALS
RESPIRATION RATE: 16 BRPM | HEART RATE: 82 BPM | SYSTOLIC BLOOD PRESSURE: 108 MMHG | BODY MASS INDEX: 35.02 KG/M2 | WEIGHT: 188.4 LBS | DIASTOLIC BLOOD PRESSURE: 72 MMHG | TEMPERATURE: 98.1 F | OXYGEN SATURATION: 98 %

## 2021-06-08 DIAGNOSIS — Z32.01 PREGNANCY TEST POSITIVE: ICD-10-CM

## 2021-06-08 DIAGNOSIS — N84.1 CERVICAL POLYP: ICD-10-CM

## 2021-06-08 DIAGNOSIS — O43.119 CIRCUMVALLATE PLACENTA, UNSPECIFIED TRIMESTER: ICD-10-CM

## 2021-06-08 DIAGNOSIS — Z34.00 SUPERVISION OF NORMAL FIRST PREGNANCY, ANTEPARTUM: Primary | ICD-10-CM

## 2021-06-08 PROCEDURE — 99213 OFFICE O/P EST LOW 20 MIN: CPT | Mod: GC | Performed by: FAMILY MEDICINE

## 2021-06-08 RX ORDER — PRENATAL VIT/IRON FUM/FOLIC AC 27MG-0.8MG
1 TABLET ORAL DAILY
Qty: 90 TABLET | Refills: 3 | Status: SHIPPED | OUTPATIENT
Start: 2021-06-08

## 2021-06-08 NOTE — PATIENT INSTRUCTIONS
Tylenol is the only safe medication for pain when pregnant    Need to be seen by a doctor in about a month, sooner if having bleeding

## 2021-06-08 NOTE — PROGRESS NOTES
Preceptor Attestation:   Patient seen, evaluated and discussed with the resident. I have verified the content of the note, which accurately reflects my assessment of the patient and the plan of care.   Supervising Physician:  Angeles Oconnell MD

## 2021-06-08 NOTE — PROGRESS NOTES
Assessment & Plan  31 year old  at 22w4d with BETHANY Oct 8, 2021 based on LMP and early US    Diaz was seen today for prenatal care.    Diagnoses and all orders for this visit:    Supervision of normal first pregnancy, antepartum  Will be moving to [place] this week; will be looking to establish with a primary there.   -     Prenatal Vit-Fe Fumarate-FA (PRENATAL MULTIVITAMIN W/IRON) 27-0.8 MG tablet; Take 1 tablet by mouth daily    Cervical polyp  Circumvallate placenta, unspecified trimester  Small amount of bleeding last week- has multiple reasons for bleeding as above. Fetal tones great today, and fetal survey 2 weeks ago looking good as far as  growth/features.   Continue to closely monitor.     With the partially circumvallate placenta- per radiology notes only involves the margins of placenta so unsure how clinically significant it is. Likely will need to more closely monitor fetal growth, potential growth ultrasounds later on in pregnancy, but will wait for [facility] reccs with her records from her last visit (and can send on to her future provider in [place])      Weight gain adequate: 3.81 kg (8 lb 6.4 oz) to date, out of recommended total of 25-35 lbs (pregravid BMI 18.5-24.9)    Patient Instructions   Tylenol is the only safe medication for pain when pregnant      Return to clinic in 4 weeks.    Luzmaria Barnes MD  I precepted today with Kourtney.     Review of outside ultrasounds.         Subjective  Concerns: none- moving to [place]    ROS:  No - Headache  No - Changes in vision  No - Chest Pain  No - Shortness of Breath  No - Nausea   No - Vomiting  No - Abdominal pain   No - Contractions  No - Dysuria   No - Vaginal Discharge    YES - Vaginal bleeding- a couple weeks ago and it self-resolved. Dark brown blood then pink and then nothing  No - Loss of Fluid   No - Extremity swelling   Present - Fetal movement       Patient Active Problem List   Diagnosis     Supervision of normal first  pregnancy     Circumvallate placenta, unspecified trimester       Diaz PRINGLE Segundo speaks English so an  was not used today.    Guidance:  signs of miscarriage  domestic abuse  smoking intervention  OTC medications  genetic testing  weight gain and exercise  quickening  child birth education    Going to [facility]? No, does not meet qualifications      Objective  /72 (BP Location: Left arm, Patient Position: Sitting, Cuff Size: Adult Regular)   Pulse 82   Temp 98.1  F (36.7  C) (Oral)   Resp 16   Wt 85.5 kg (188 lb 6.4 oz)   LMP 01/01/2021 (Exact Date)   SpO2 98%   BMI 35.02 kg/m    No distress.  Gravid abdomen.  -150.  Fundal height 23 cm.  trace edema.    Results  Blood type: A POS  No results found for any visits on 06/08/21.

## 2021-06-10 ENCOUNTER — TELEPHONE (OUTPATIENT)
Dept: FAMILY MEDICINE | Facility: CLINIC | Age: 31
End: 2021-06-10

## 2021-06-10 NOTE — TELEPHONE ENCOUNTER
Pt states that she has received 2 bills from [facility]:  $1,244.57 for the following dates; 21, 21, and 3/9/21.  $208.54 for the following dates: 21 and 21.  She states that she called the billing office and they told her to [facility] and [facility] but she has been having difficulty reaching anyone.  She states that she paid for [employer] through February so should have had insurance.      Called [facility] [phone].  Spoke with Deepali and gave her pt's [place] number and she was able to verify that pt had [place] 21-3/31/21.  She will resubmit the bills to [place] and pt should not get any further bills.    Called pt back and gave her info.  Told her to call if she receives any further bills.  Also told her to call [facility] to ask about why she was billed for [employer] in January and February when she had [place]./GUILLERMO

## 2021-07-07 ENCOUNTER — HOSPITAL ENCOUNTER (OUTPATIENT)
Facility: CLINIC | Age: 31
End: 2021-07-07
Payer: COMMERCIAL

## 2021-07-20 NOTE — PROGRESS NOTES
[facility] [place] [facility] [place]  Genetic Counseling Consult    Patient: Diaz Raymond YOB: 1990   Date of Service: 2021      Diaz Raymond was seen at [facility] [place] for genetic consultation to discuss the options for routine screening for fetal chromosome abnormalities.       Impression/Plan:   Diaz Perales had a blood draw for NIPT (Innatal test through "[employer]  Results are expected within 5-7 days, and will be available in [place].  We will contact her to discuss the results, and a copy will be forwarded to the office of the referring OB provider. Diaz Perales requested that we call her cell phone with results. She does not have voicemail set up. She does not want to know fetal sex.    The patient also had her blood drawn for maternal serum AFP (single marker screen) to screen for open neural tube defects. A quad screen should not be performed.    The order from her primary OB included a 2/3 complete ultrasound order. Diaz perales was only scheduled for genetic counseling to discuss aneuploidy screening options today. She was offered the option of returning for a 2/3 complete ultrasound next week. She was also offered the option of returning for a comprehensive level II ultrasound for fetal anatomy screening.  Diaz perales opted to return for comprehensive level II between 18-20 weeks.     Diaz perales mentioned that her primary doctor was also worried about a cervical polyp. She asked if this would been visible on her abdominal comprehensive level II ultrasound. If her primary doctor would like this to be evaluated, an order for a transvaginal ultrasound can be placed. This plan was reviewed with our Maternal Fetal Medicine physician.    Diaz perales is planning to relocate to [place] in  with her partner.    Pregnancy History:   /Parity:    Age at Delivery: 31 y.o.  BETHANY: 10/08/2021   Gestational Age: 15w0d    No significant  complications or exposures were reported in the current pregnancy.    Pregnancy history:  o 2020: SAB       Family History:   A three-generation pedigree was obtained, and is scanned under the  Media  tab.   The reported family history is negative for multiple miscarriages, stillbirths, birth defects, mental retardation, known genetic conditions, and consanguinity.       Carrier Screening:   The patient reports that she and the father of the pregnancy have  ancestry:      The hemoglobinopathies are a group of genetic blood diseases that occur with increased frequency in individuals of  ancestry and carrier screening for these conditions is available.  Carrier screening for the hemoglobinopathies includes a CBC with red blood cell indices, a ferritin level, and a quantitative hemoglobin electrophoresis or HPLC.  In addition,  screening in the [facility] includes many of the hemoglobinopathies.      Expanded carrier screening for mutations in a large panel of genes associated with autosomal recessive conditions including cystic fibrosis, spinal muscular atrophy, and others, is now available.      The patient has declined the carrier screening options reviewed today.       Risk Assessment for Chromosome Conditions:   We explained that the risk for fetal chromosome abnormalities increases with maternal age. We discussed specific features of common chromosome abnormalities, including Down syndrome, trisomy 13, trisomy 18, and sex chromosome trisomies.      At age 31 at delivery, the midtrimester risk to have a baby with Down syndrome is 1 in 508.     At age 31 at delivery, the midtrimester risk to have a baby with any chromosome abnormality is 1 in 254.        Testing Options:   We discussed the following options:  Non-invasive Prenatal Testing (NIPT)    Maternal plasma cell-free DNA testing; first trimester ultrasound with nuchal translucency and nasal bone assessment is recommended, when  appropriate    Screens for fetal trisomy 21, trisomy 13, trisomy 18, and sex chromosome aneuploidy    Cannot screen for open neural tube defects; maternal serum AFP after 15 weeks is recommended    Quad screen:     Maternal plasma AFP, hCG, estriol, and inhibin measurement between 15-24 weeks gestation    Provides risk assessment for fetal Down syndrome, trisomy 18, and neural tube defects    Genetic Amniocentesis    Invasive procedure typically performed in the second trimester by which amniotic fluid is obtained for the purpose of chromosome analysis and/or other prenatal genetic analysis    Diagnostic results; >99% sensitivity for fetal chromosome abnormalities    AFAFP measurement tests for open neural tube defects    Comprehensive (Level II) ultrasound: Detailed ultrasound performed between 18-22 weeks gestation to screen for major birth defects and markers for aneuploidy.    We reviewed the benefits and limitations of this testing.  Screening tests provide a risk assessment specific to the pregnancy for certain fetal chromosome abnormalities, but cannot definitively diagnose or exclude a fetal chromosome abnormality.  Follow-up genetic counseling and consideration of diagnostic testing is recommended with any abnormal screening result.      It was a pleasure to be involved with [facility] care. Face-to-face time of the meeting was 50 minutes.    Clara Beltran MS, [facility]  Maternal Fetal Medicine  [facility]  Phone:[phone]  Phone: [phone]  Email: [email or web address]

## 2021-07-20 NOTE — PROGRESS NOTES
"Please see the \"Imaging\" tab for details of today's ultrasound.    Bertha Saul MD  Specialist in Maternal-Fetal Medicine    "

## 2021-07-20 NOTE — TELEPHONE ENCOUNTER
Called Diaz Perales and reported low risk maternal serum AFP/ONTD results. Results indicate the risk for an ONTD in the current pregnancy 1 in 5,640 which is lower than the pre-test risk of 1 in 1030. The NIPT is still pending.    Clara Beltran MS, [facility]  Maternal Fetal Medicine  [facility]  Phone:[phone]  Phone: [phone]  Email: [email or web address]

## 2021-07-20 NOTE — TELEPHONE ENCOUNTER
Called and discussed normal NIPT results with Nae. Results indicate NO ANEUPLOIDY DETECTED for chromosomes 21, 18, 13, or sex chromosomes. Diaz declined the sex chromosome information.     This puts her current pregnancy at low risk for Down syndrome, trisomy 18, trisomy 13 and sex chromosome abnormalities. This test is reported to have the following sensitivities: Down syndrome- 99%, trisomy 18- 98%, and trisomy 13- 98%. Although these results are reassuring, this does not replace a standard chromosome analysis from a chorionic villus sampling or amniocentesis.     Nae is scheduled for a [facility] ultrasound on 05/20/2021.     MSAFP is the appropriate second trimester screening test for open neural tube defects; the maternal quad screen is not recommended.    Her results are available in her Epic chart for her primary OB to review.    Kristine Caldwell MS, [facility]  Licensed Genetic Counselor   [facility]  Maternal Fetal Medicine  [email or web address]  [email or web address]  Office: [phone] Pager [phone]  [facility] Office: [phone]  [facility]: [phone]   Fax: [phone]

## 2025-03-20 NOTE — PROGRESS NOTES
Preceptor Attestation:   Patient seen, evaluated and discussed with the resident. I have verified the content of the note, which accurately reflects my assessment of the patient and the plan of care.   Supervising Physician:  Sabiha Morales MD    [FreeTextEntry1] : 30-year-old -0-2-0 presently on Clomid for ovulation and progesterone level shows that she is ovulating.  Partner is still in [place] and she goes often to visit.  She is encouraged to continue with the Clomid and return to the office in 4 weeks.  Risks and benefits of Clomid discussed.  All questions answered.